# Patient Record
Sex: MALE | Race: WHITE | NOT HISPANIC OR LATINO
[De-identification: names, ages, dates, MRNs, and addresses within clinical notes are randomized per-mention and may not be internally consistent; named-entity substitution may affect disease eponyms.]

---

## 2017-01-04 ENCOUNTER — APPOINTMENT (OUTPATIENT)
Dept: CARDIOLOGY | Facility: CLINIC | Age: 52
End: 2017-01-04

## 2017-01-04 VITALS
HEART RATE: 77 BPM | WEIGHT: 254 LBS | SYSTOLIC BLOOD PRESSURE: 138 MMHG | BODY MASS INDEX: 34.4 KG/M2 | HEIGHT: 72 IN | DIASTOLIC BLOOD PRESSURE: 90 MMHG

## 2017-02-01 ENCOUNTER — APPOINTMENT (OUTPATIENT)
Dept: CARDIOLOGY | Facility: CLINIC | Age: 52
End: 2017-02-01

## 2017-02-18 ENCOUNTER — INPATIENT (INPATIENT)
Facility: HOSPITAL | Age: 52
LOS: 0 days | Discharge: HOME | End: 2017-02-19

## 2017-02-27 ENCOUNTER — APPOINTMENT (OUTPATIENT)
Dept: CARDIOLOGY | Facility: CLINIC | Age: 52
End: 2017-02-27

## 2017-04-24 ENCOUNTER — APPOINTMENT (OUTPATIENT)
Dept: CARDIOLOGY | Facility: CLINIC | Age: 52
End: 2017-04-24

## 2017-04-24 ENCOUNTER — APPOINTMENT (OUTPATIENT)
Dept: SURGERY | Facility: CLINIC | Age: 52
End: 2017-04-24

## 2017-04-24 VITALS
WEIGHT: 242 LBS | BODY MASS INDEX: 32.78 KG/M2 | HEART RATE: 70 BPM | SYSTOLIC BLOOD PRESSURE: 140 MMHG | DIASTOLIC BLOOD PRESSURE: 80 MMHG | HEIGHT: 72 IN

## 2017-06-27 DIAGNOSIS — I10 ESSENTIAL (PRIMARY) HYPERTENSION: ICD-10-CM

## 2017-06-27 DIAGNOSIS — I48.91 UNSPECIFIED ATRIAL FIBRILLATION: ICD-10-CM

## 2017-06-27 DIAGNOSIS — G47.33 OBSTRUCTIVE SLEEP APNEA (ADULT) (PEDIATRIC): ICD-10-CM

## 2017-10-23 ENCOUNTER — APPOINTMENT (OUTPATIENT)
Dept: CARDIOLOGY | Facility: CLINIC | Age: 52
End: 2017-10-23

## 2017-10-23 VITALS
BODY MASS INDEX: 32.91 KG/M2 | WEIGHT: 243 LBS | HEART RATE: 66 BPM | DIASTOLIC BLOOD PRESSURE: 86 MMHG | HEIGHT: 72 IN | SYSTOLIC BLOOD PRESSURE: 140 MMHG

## 2018-02-06 ENCOUNTER — EMERGENCY (EMERGENCY)
Facility: HOSPITAL | Age: 53
LOS: 0 days | Discharge: HOME | End: 2018-02-06
Attending: EMERGENCY MEDICINE

## 2018-02-06 VITALS
WEIGHT: 244.93 LBS | HEIGHT: 72 IN | TEMPERATURE: 98 F | RESPIRATION RATE: 20 BRPM | SYSTOLIC BLOOD PRESSURE: 170 MMHG | DIASTOLIC BLOOD PRESSURE: 100 MMHG | HEART RATE: 102 BPM

## 2018-02-06 VITALS — WEIGHT: 250 LBS

## 2018-02-06 DIAGNOSIS — I10 ESSENTIAL (PRIMARY) HYPERTENSION: ICD-10-CM

## 2018-02-06 DIAGNOSIS — Z98.890 OTHER SPECIFIED POSTPROCEDURAL STATES: Chronic | ICD-10-CM

## 2018-02-06 DIAGNOSIS — Z90.49 ACQUIRED ABSENCE OF OTHER SPECIFIED PARTS OF DIGESTIVE TRACT: Chronic | ICD-10-CM

## 2018-02-06 NOTE — ED PROVIDER NOTE - OBJECTIVE STATEMENT
Pt has a h/o HTN comes to the ED c/o high blood pressure. Pt states that his BP has been fluctuating over the last 2 weeks. Pt states that he feels it go up and begins to get pressure in his head but the pressure is relieved once the BP goes down. Pt has been on the same dose of medication for the last 10 years. No f/c/cp.

## 2018-02-06 NOTE — ED PROVIDER NOTE - NS_ ATTENDINGSCRIBEDETAILS _ED_A_ED_FT
I personally evaluated patient. I agree with the findings and plan with all documentation on chart except as documented  in my note.    Full DC instructions discussed and patient knows when to seek immediate medical attention.  Patient has proper follow up.  All results discussed and patient aware they may require further work up.  Limitations of ED work up discussed.  Home meds discussed.  All questions and concerns from patient or family addressed.

## 2018-02-12 ENCOUNTER — APPOINTMENT (OUTPATIENT)
Dept: CARDIOLOGY | Facility: CLINIC | Age: 53
End: 2018-02-12

## 2018-02-12 VITALS
DIASTOLIC BLOOD PRESSURE: 82 MMHG | HEART RATE: 77 BPM | SYSTOLIC BLOOD PRESSURE: 132 MMHG | WEIGHT: 250 LBS | HEIGHT: 72 IN | BODY MASS INDEX: 33.86 KG/M2

## 2018-03-01 ENCOUNTER — APPOINTMENT (OUTPATIENT)
Dept: CARDIOLOGY | Facility: CLINIC | Age: 53
End: 2018-03-01

## 2018-04-23 ENCOUNTER — INPATIENT (INPATIENT)
Facility: HOSPITAL | Age: 53
LOS: 0 days | Discharge: HOME | End: 2018-04-24
Attending: INTERNAL MEDICINE | Admitting: INTERNAL MEDICINE

## 2018-04-23 ENCOUNTER — APPOINTMENT (OUTPATIENT)
Dept: CARDIOLOGY | Facility: CLINIC | Age: 53
End: 2018-04-23

## 2018-04-23 VITALS
WEIGHT: 251 LBS | SYSTOLIC BLOOD PRESSURE: 100 MMHG | HEART RATE: 162 BPM | HEIGHT: 72 IN | BODY MASS INDEX: 34 KG/M2 | DIASTOLIC BLOOD PRESSURE: 80 MMHG

## 2018-04-23 VITALS
TEMPERATURE: 97 F | DIASTOLIC BLOOD PRESSURE: 91 MMHG | SYSTOLIC BLOOD PRESSURE: 133 MMHG | OXYGEN SATURATION: 100 % | RESPIRATION RATE: 18 BRPM | HEART RATE: 80 BPM

## 2018-04-23 DIAGNOSIS — Z98.890 OTHER SPECIFIED POSTPROCEDURAL STATES: Chronic | ICD-10-CM

## 2018-04-23 DIAGNOSIS — Z90.49 ACQUIRED ABSENCE OF OTHER SPECIFIED PARTS OF DIGESTIVE TRACT: Chronic | ICD-10-CM

## 2018-04-23 LAB
ANION GAP SERPL CALC-SCNC: 13 MMOL/L — SIGNIFICANT CHANGE UP (ref 7–14)
ANION GAP SERPL CALC-SCNC: 14 MMOL/L — SIGNIFICANT CHANGE UP (ref 7–14)
BASOPHILS # BLD AUTO: 0.05 K/UL — SIGNIFICANT CHANGE UP (ref 0–0.2)
BASOPHILS NFR BLD AUTO: 0.6 % — SIGNIFICANT CHANGE UP (ref 0–1)
BUN SERPL-MCNC: 13 MG/DL — SIGNIFICANT CHANGE UP (ref 10–20)
BUN SERPL-MCNC: 16 MG/DL — SIGNIFICANT CHANGE UP (ref 10–20)
CALCIUM SERPL-MCNC: 8.5 MG/DL — SIGNIFICANT CHANGE UP (ref 8.5–10.1)
CALCIUM SERPL-MCNC: 9.1 MG/DL — SIGNIFICANT CHANGE UP (ref 8.5–10.1)
CHLORIDE SERPL-SCNC: 102 MMOL/L — SIGNIFICANT CHANGE UP (ref 98–110)
CHLORIDE SERPL-SCNC: 104 MMOL/L — SIGNIFICANT CHANGE UP (ref 98–110)
CHOLEST SERPL-MCNC: 152 MG/DL — SIGNIFICANT CHANGE UP (ref 100–200)
CK MB CFR SERPL CALC: 1.4 NG/ML — SIGNIFICANT CHANGE UP (ref 0.6–6.3)
CK SERPL-CCNC: 168 U/L — SIGNIFICANT CHANGE UP (ref 0–225)
CO2 SERPL-SCNC: 23 MMOL/L — SIGNIFICANT CHANGE UP (ref 17–32)
CO2 SERPL-SCNC: 26 MMOL/L — SIGNIFICANT CHANGE UP (ref 17–32)
CREAT SERPL-MCNC: 0.8 MG/DL — SIGNIFICANT CHANGE UP (ref 0.7–1.5)
CREAT SERPL-MCNC: 1.1 MG/DL — SIGNIFICANT CHANGE UP (ref 0.7–1.5)
EOSINOPHIL # BLD AUTO: 0.08 K/UL — SIGNIFICANT CHANGE UP (ref 0–0.7)
EOSINOPHIL NFR BLD AUTO: 1 % — SIGNIFICANT CHANGE UP (ref 0–8)
ESTIMATED AVERAGE GLUCOSE: 111 MG/DL — SIGNIFICANT CHANGE UP (ref 68–114)
GLUCOSE SERPL-MCNC: 100 MG/DL — HIGH (ref 70–99)
GLUCOSE SERPL-MCNC: 101 MG/DL — HIGH (ref 70–99)
HBA1C BLD-MCNC: 5.5 % — SIGNIFICANT CHANGE UP (ref 4–5.6)
HCT VFR BLD CALC: 46.7 % — SIGNIFICANT CHANGE UP (ref 42–52)
HDLC SERPL-MCNC: 44 MG/DL — SIGNIFICANT CHANGE UP (ref 40–125)
HGB BLD-MCNC: 15.7 G/DL — SIGNIFICANT CHANGE UP (ref 14–18)
IMM GRANULOCYTES NFR BLD AUTO: 0.4 % — HIGH (ref 0.1–0.3)
LIPID PNL WITH DIRECT LDL SERPL: 100 MG/DL — SIGNIFICANT CHANGE UP (ref 4–129)
LYMPHOCYTES # BLD AUTO: 2.21 K/UL — SIGNIFICANT CHANGE UP (ref 1.2–3.4)
LYMPHOCYTES # BLD AUTO: 28.5 % — SIGNIFICANT CHANGE UP (ref 20.5–51.1)
MAGNESIUM SERPL-MCNC: 1.9 MG/DL — SIGNIFICANT CHANGE UP (ref 1.8–2.4)
MCHC RBC-ENTMCNC: 31 PG — SIGNIFICANT CHANGE UP (ref 27–31)
MCHC RBC-ENTMCNC: 33.6 G/DL — SIGNIFICANT CHANGE UP (ref 32–37)
MCV RBC AUTO: 92.3 FL — SIGNIFICANT CHANGE UP (ref 80–94)
MONOCYTES # BLD AUTO: 1.01 K/UL — HIGH (ref 0.1–0.6)
MONOCYTES NFR BLD AUTO: 13 % — HIGH (ref 1.7–9.3)
NEUTROPHILS # BLD AUTO: 4.38 K/UL — SIGNIFICANT CHANGE UP (ref 1.4–6.5)
NEUTROPHILS NFR BLD AUTO: 56.5 % — SIGNIFICANT CHANGE UP (ref 42.2–75.2)
NRBC # BLD: 0 /100 WBCS — SIGNIFICANT CHANGE UP (ref 0–0)
PLATELET # BLD AUTO: 290 K/UL — SIGNIFICANT CHANGE UP (ref 130–400)
POTASSIUM SERPL-MCNC: 3.9 MMOL/L — SIGNIFICANT CHANGE UP (ref 3.5–5)
POTASSIUM SERPL-MCNC: 4.8 MMOL/L — SIGNIFICANT CHANGE UP (ref 3.5–5)
POTASSIUM SERPL-SCNC: 3.9 MMOL/L — SIGNIFICANT CHANGE UP (ref 3.5–5)
POTASSIUM SERPL-SCNC: 4.8 MMOL/L — SIGNIFICANT CHANGE UP (ref 3.5–5)
RBC # BLD: 5.06 M/UL — SIGNIFICANT CHANGE UP (ref 4.7–6.1)
RBC # FLD: 13.1 % — SIGNIFICANT CHANGE UP (ref 11.5–14.5)
SODIUM SERPL-SCNC: 140 MMOL/L — SIGNIFICANT CHANGE UP (ref 135–146)
SODIUM SERPL-SCNC: 142 MMOL/L — SIGNIFICANT CHANGE UP (ref 135–146)
TOTAL CHOLESTEROL/HDL RATIO MEASUREMENT: 3.5 RATIO — LOW (ref 4–5.5)
TRIGL SERPL-MCNC: 95 MG/DL — SIGNIFICANT CHANGE UP (ref 10–149)
TROPONIN T SERPL-MCNC: <0.01 NG/ML — SIGNIFICANT CHANGE UP
TROPONIN T SERPL-MCNC: <0.01 NG/ML — SIGNIFICANT CHANGE UP
WBC # BLD: 7.76 K/UL — SIGNIFICANT CHANGE UP (ref 4.8–10.8)
WBC # FLD AUTO: 7.76 K/UL — SIGNIFICANT CHANGE UP (ref 4.8–10.8)

## 2018-04-23 RX ORDER — SODIUM CHLORIDE 9 MG/ML
1000 INJECTION INTRAMUSCULAR; INTRAVENOUS; SUBCUTANEOUS
Qty: 0 | Refills: 0 | Status: DISCONTINUED | OUTPATIENT
Start: 2018-04-23 | End: 2018-04-24

## 2018-04-23 RX ORDER — VALSARTAN 80 MG/1
160 TABLET ORAL DAILY
Qty: 0 | Refills: 0 | Status: DISCONTINUED | OUTPATIENT
Start: 2018-04-23 | End: 2018-04-23

## 2018-04-23 RX ORDER — METOPROLOL TARTRATE 50 MG
5 TABLET ORAL ONCE
Qty: 0 | Refills: 0 | Status: COMPLETED | OUTPATIENT
Start: 2018-04-23 | End: 2018-04-23

## 2018-04-23 RX ORDER — AMLODIPINE BESYLATE 2.5 MG/1
5 TABLET ORAL DAILY
Qty: 0 | Refills: 0 | Status: DISCONTINUED | OUTPATIENT
Start: 2018-04-23 | End: 2018-04-23

## 2018-04-23 RX ORDER — AMIODARONE HYDROCHLORIDE 400 MG/1
150 TABLET ORAL ONCE
Qty: 0 | Refills: 0 | Status: COMPLETED | OUTPATIENT
Start: 2018-04-23 | End: 2018-04-23

## 2018-04-23 RX ORDER — ENOXAPARIN SODIUM 100 MG/ML
110 INJECTION SUBCUTANEOUS
Qty: 0 | Refills: 0 | Status: DISCONTINUED | OUTPATIENT
Start: 2018-04-23 | End: 2018-04-24

## 2018-04-23 RX ORDER — DILTIAZEM HCL 120 MG
5 CAPSULE, EXT RELEASE 24 HR ORAL
Qty: 125 | Refills: 0 | Status: DISCONTINUED | OUTPATIENT
Start: 2018-04-23 | End: 2018-04-23

## 2018-04-23 RX ORDER — SODIUM CHLORIDE 9 MG/ML
3 INJECTION INTRAMUSCULAR; INTRAVENOUS; SUBCUTANEOUS ONCE
Qty: 0 | Refills: 0 | Status: COMPLETED | OUTPATIENT
Start: 2018-04-23 | End: 2018-04-23

## 2018-04-23 RX ORDER — AMIODARONE HYDROCHLORIDE 400 MG/1
1 TABLET ORAL
Qty: 900 | Refills: 0 | Status: DISCONTINUED | OUTPATIENT
Start: 2018-04-23 | End: 2018-04-24

## 2018-04-23 RX ORDER — DILTIAZEM HCL 120 MG
5 CAPSULE, EXT RELEASE 24 HR ORAL
Qty: 125 | Refills: 0 | Status: DISCONTINUED | OUTPATIENT
Start: 2018-04-23 | End: 2018-04-24

## 2018-04-23 RX ORDER — ASPIRIN/CALCIUM CARB/MAGNESIUM 324 MG
81 TABLET ORAL DAILY
Qty: 0 | Refills: 0 | Status: DISCONTINUED | OUTPATIENT
Start: 2018-04-23 | End: 2018-04-23

## 2018-04-23 RX ORDER — AMLODIPINE BESYLATE 2.5 MG/1
0 TABLET ORAL
Qty: 0 | Refills: 0 | COMMUNITY

## 2018-04-23 RX ADMIN — ENOXAPARIN SODIUM 110 MILLIGRAM(S): 100 INJECTION SUBCUTANEOUS at 14:54

## 2018-04-23 RX ADMIN — Medication 5 MG/HR: at 22:09

## 2018-04-23 RX ADMIN — SODIUM CHLORIDE 100 MILLILITER(S): 9 INJECTION INTRAMUSCULAR; INTRAVENOUS; SUBCUTANEOUS at 19:30

## 2018-04-23 RX ADMIN — AMIODARONE HYDROCHLORIDE 33.33 MG/MIN: 400 TABLET ORAL at 22:13

## 2018-04-23 RX ADMIN — AMIODARONE HYDROCHLORIDE 618 MILLIGRAM(S): 400 TABLET ORAL at 21:54

## 2018-04-23 RX ADMIN — Medication 5 MILLIGRAM(S): at 14:51

## 2018-04-23 RX ADMIN — Medication 5 MG/HR: at 12:26

## 2018-04-23 RX ADMIN — SODIUM CHLORIDE 3 MILLILITER(S): 9 INJECTION INTRAMUSCULAR; INTRAVENOUS; SUBCUTANEOUS at 13:35

## 2018-04-23 NOTE — ED PROVIDER NOTE - CRITICAL CARE PROVIDED
interpretation of diagnostic studies/direct patient care (not related to procedure)/documentation/consult w/ pt's family directly relating to pts condition/consultation with other physicians

## 2018-04-23 NOTE — CONSULT NOTE ADULT - ASSESSMENT
pafib with RVR , asymptomatic hemodynamically stable  Hx of HTN, ESTEFANIA       Plan:  Tele  echo  Check TSH  Continue IV cardizem may switch to Po in 24 hrs   Continue anticoagulation with lovenox for now  Start amiodarone IV drip if not converted to sinus with consider ELAINA then electrical cardioversion   CAHDSvasc = 1  Continue ARB pafib with RVR , asymptomatic hemodynamically stable  Hx of HTN, ESTEFANIA       Plan:  Tele  echo  Check TSH  Continue IV cardizem may switch to Po in 24 hrs   Continue anticoagulation with lovenox for now  Start amiodarone IV drip if not converted to sinus with consider ELAINA then electrical cardioversion   CAHDSvasc = 1  Continue ARB    Attending: Patient seen and examined. Converted to NSR. Now in sinus rhythm with HR of 70's. No chest pain or palpitations.  Plan: D/c amlodipine. Start Cardizem 180 mg po QD. Will give 4 weeks of anticoagulation (Xarelto 20 mg q24), but then may d/c, as his CHADS-VASC score is low. D/c home today. F/u with me in 2 weeks. Will monitor clinically. If recurrent A. fib will refer to Dr. Blake for ablation, but would try medical therapy as first line. Management of sleep apnea discussed. F/u with pulmonary. F/u with the PMD.  D/c tele, d/c home.  D/w the cardiac fellow and medicine resident.

## 2018-04-23 NOTE — H&P ADULT - ATTENDING COMMENTS
pt seen and examined independently, presented with lightheadedness, and rapid afib,  continue Iv  cardizem,  and lmwh  tele  hold asa  possible cardioversion  cardio appreciated  I  have read and agree with above exam and poa

## 2018-04-23 NOTE — H&P ADULT - NSHPLABSRESULTS_GEN_ALL_CORE
15.7   7.76  )-----------( 290      ( 23 Apr 2018 12:12 )             46.7       04-23    142  |  102  |  16  ----------------------------<  101<H>  4.8   |  26  |  1.1    Ca    9.1      23 Apr 2018 12:12         CARDIAC MARKERS ( 23 Apr 2018 12:12 )  x     / <0.01 ng/mL / x     / x     / x

## 2018-04-23 NOTE — ED PROVIDER NOTE - PHYSICAL EXAMINATION
VSS, awake, alert, non-toxic appearing, lying comfortably in stretcher, in NAD, ears clear, oropharynx clear, mmm, no JVD or bruit, no skin rash or lesions, chest CTAB, non-labored breathing, no w/r/r, +S1/S2, irreg irreg rhythm, no m/r/g, abdomen soft, NT, ND, +BS, no peripheral edema or deformities, alert, clear speech and steady gait.

## 2018-04-23 NOTE — ED PROVIDER NOTE - PROGRESS NOTE DETAILS
HR 80s HR 150s -150 s/p cardizem 10 mg, will give second dose. [id number] score 0 -140, will start IV drip

## 2018-04-23 NOTE — H&P ADULT - NSHPSOCIALHISTORY_GEN_ALL_CORE
Social History:    Marital Status:  ( X  )    (   ) Single    (   )    (  )   Occupation:   Lives with: (  ) alone  (  ) children   ( X  ) spouse   (  ) parents  (  ) other    Substance Use (street drugs): ( X ) never used  (  ) other:  Tobacco Usage:  (  X ) never smoked   (   ) former smoker   (   ) current smoker  (     ) pack year  (        ) last cigarette date  Alcohol Usage: 2-3 drinks/week, last drink 1 week ago

## 2018-04-23 NOTE — H&P ADULT - ASSESSMENT
52 yo M w/ h/o HTN, paroxysmal A Fib in 02/2017--> admitted at [facility], converted to NSR w/ IV medication per patient, ESTEFANIA-CPAP not tolerable, using oral appliance to pull out the jaw, obesity sent in from Dr Brunson's office for finding of A Fib w/ RVR for IV cardizem, IVF, anticoagulation and possible cardioversion.    Pt went to see Dr Brunson for his routine visit, for f/u on blood pressure. His norvasc dose was recently increased to 10 from 5 about a month ago. SInce then he has been feeling lightheaded on getting up from bed and he noticed that his BP is around 110/65 as opposed to 130-140/80-90 before, so he thought Norvasc is causing this lightheadedness and he   changed his norvasc dose to 5 mg and 10 mg alternating everyday. Today he went for routine follow up and on ekg was found to have A Fib w/ RVR.     In ED, VS on presentation, 97.4F, 80, 133/91, 100% ra  received, 20 IV cardizem and started on cardizem drip. ALso received 1 L IV bolus of LR    A & P:    # A fib w/ RVR 2/2 unknown underlying etiology, possibly ESTEFANIA  - admit to telemetry for monitoring  - CE 2 more sets, TSH, check lipid profile and A1c for cardiovascular risk assessment, 2d echo to r/o structural cause  - hold anti-HTN meds for now as per Dr Brunson's note, pt's BP in office was 100s  - c/w cardizem drip for rate control, HR still in 120s-130s  - CHADVASC- 1--> no need of long term anticoagulation but Dr Brunson's note recommends Anticoagulation for possible plan for cardioversion, so will start SQ lovenox q12h  - F/u w/ Dr Brunson regarding plan for Cardioversion  - pt didn't tolerate CPAP machine, can continue w/ oral appliance, counselled on weight loss for better control of ESTEFANIA    # HTN--> hold anti-HTN meds for now as pt is on cardizem drip and may drop BP 2/2 a fib  --> restart meds if BP runs high    # HCM--> hold aspirin for now as pt is going to get SQ lovenox as pt has no h/o CAD    # DVT Px--> lovenox    # Diet--> keep NPO for possible cardioversion     Activity--> OOB w/ assistance 54 yo M w/ h/o HTN, paroxysmal A Fib in 02/2017--> admitted at [facility], converted to NSR w/ IV medication per patient, ESTEFANIA-CPAP not tolerable, using oral appliance to pull out the jaw, obesity sent in from Dr Brunson's office for finding of A Fib w/ RVR for IV cardizem, IVF, anticoagulation and possible cardioversion.    Pt went to see Dr Brunson for his routine visit, for f/u on blood pressure. His norvasc dose was recently increased to 10 from 5 about a month ago. SInce then he has been feeling lightheaded on getting up from bed and he noticed that his BP is around 110/65 as opposed to 130-140/80-90 before, so he thought Norvasc is causing this lightheadedness and he   changed his norvasc dose to 5 mg and 10 mg alternating everyday. Today he went for routine follow up and on ekg was found to have A Fib w/ RVR.     In ED, VS on presentation, 97.4F, 80, 133/91, 100% ra  received, 20 IV cardizem and started on cardizem drip. ALso received 1 L IV bolus of LR    A & P:    # A fib w/ RVR 2/2 unknown underlying etiology, possibly ESTEFANIA  - admit to telemetry for monitoring  - CE 2 more sets, TSH, check lipid profile and A1c for cardiovascular risk assessment, 2d echo to r/o structural cause  - hold anti-HTN meds for now as per Dr Brunson's note, pt's BP in office was 100s  - c/w cardizem drip for rate control, HR still in 120s-130s--> give 1 dose of IV lopressor 5 mg  - CHADVASC- 1--> no need of long term anticoagulation but Dr Brunson's note recommends Anticoagulation for possible plan for cardioversion, so will start SQ lovenox 110 q12h  - F/u w/ Dr Brunson regarding plan for Cardioversion  - pt didn't tolerate CPAP machine, can continue w/ oral appliance, counselled on weight loss for better control of ESTEFANIA    # HTN--> hold anti-HTN meds for now as pt is on cardizem drip and may drop BP 2/2 a fib  --> restart meds if BP runs high    # HCM--> hold aspirin for now as pt is going to get SQ lovenox as pt has no h/o CAD    # DVT Px--> lovenox    # Diet--> keep NPO for possible cardioversion     Activity--> OOB w/ assistance

## 2018-04-23 NOTE — H&P ADULT - HISTORY OF PRESENT ILLNESS
52 yo M w/ h/o HTN, paroxysmal A Fib in 02/2017--> admitted at [facility], converted to NSR w/ IV medication per patient, ESTEFANIA-CPAP not tolerable, using oral appliance to pull out the jaw, obesity sent in from Dr Brunson's office for finding of A Fib w/ RVR for IV cardizem, IVF, anticoagulation and possible cardioversion.    Pt went to see Dr Brunson for his routine visit, for f/u on blood pressure. His norvasc dose was recently increased to 10 from 5 about a month ago. SInce then he has been feeling lightheaded on getting up from bed and he noticed that his BP is around 110/65 as opposed to 130-140/80-90 before, so he thought Norvasc is causing this lightheadedness and he   changed his norvasc dose to 5 mg and 10 mg alternating everyday. Today he went for routine follow up and on ekg was found to have A Fib w/ RVR.

## 2018-04-23 NOTE — ED PROVIDER NOTE - OBJECTIVE STATEMENT
54 y/o male h/o hypertension, atrial fibrillation (one episode approximately 1 year ago), appendectomy and cholecystectomy presents with rapid atrial fibrillation today.  Patient went for routine evaluation with his cardiologist Dr. Brunson.  At this time he was found to be tachycardic, patient does report some dizziness and lightheadedness on standing up otherwise denies modifying factors or associated complaints at present.

## 2018-04-23 NOTE — CONSULT NOTE ADULT - SUBJECTIVE AND OBJECTIVE BOX
HISTORY OF PRESENT ILLNESS:     This is a 53 years old M patient with PMHx of  HTN, paroxysmal A Fib , ESTEFANIA-CPAP not tolerable,  obese sent in from Dr Brunson's office for A Fib w/ RVR  Pt went to see Dr rBunson for his routine visit, for f/u on blood pressure.         PAST MEDICAL & SURGICAL HISTORY:  Atrial fibrillation: paroxysmal  Sleep apnea  Hypertension, unspecified type  History of appendectomy  Absence of gallbladder    FAMILY HISTORY:  No pertinent family history in first degree relatives    Allergies    Percocet 10/325 (Unknown)    Intolerances    	  Home Medications:  amLODIPine 2.5 mg oral tablet, disintegrating: 10  mg q48h anternates w/ 5 mg q48h  (23 Apr 2018 14:14)  Aspir 81 oral delayed release tablet: 1 tab(s) orally once a day (23 Apr 2018 14:14)  valsartan 160 mg oral capsule:  (23 Apr 2018 14:14)    MEDICATIONS  (STANDING):  diltiazem Infusion 5 mG/Hr (5 mL/Hr) IV Continuous <Continuous>  enoxaparin Injectable 110 milliGRAM(s) SubCutaneous two times a day  sodium chloride 0.9%. 1000 milliLiter(s) (100 mL/Hr) IV Continuous <Continuous>    MEDICATIONS  (PRN):        SOCIAL HISTORY:    [ ] Non-smoker  [ ] Smoker  [ ] Alcohol      REVIEW OF SYSTEMS:    PHYSICAL EXAM:  T(C): 36.9 (04-23-18 @ 15:26), Max: 36.9 (04-23-18 @ 15:26)  HR: 120 (04-23-18 @ 18:41) (73 - 159)  BP: 128/91 (04-23-18 @ 18:41) (99/74 - 133/91)  RR: 17 (04-23-18 @ 18:41) (17 - 18)  SpO2: 96% (04-23-18 @ 18:41) (96% - 100%)  Wt(kg): --  I&O's Summary    Daily     Daily     General Appearance: Normal	  Cardiovascular: Normal S1 S2, No JVD, No murmurs, No edema  Respiratory: Lungs clear to auscultation	  Psychiatry: A & O x 3, Mood & affect appropriate  Gastrointestinal:  Soft, Non-tender  Skin: No rashes, No ecchymoses, No cyanosis	  Neurologic: Non-focal  Extremities: Normal range of motion, No clubbing, cyanosis or edema  Vascular: Peripheral pulses palpable 2+ bilaterally        LABS:	 	                        15.7   7.76  )-----------( 290      ( 23 Apr 2018 12:12 )             46.7     04-23    140  |  104  |  13  ----------------------------<  100<H>  3.9   |  23  |  0.8  04-23    142  |  102  |  16  ----------------------------<  101<H>  4.8   |  26  |  1.1    Ca    8.5      23 Apr 2018 16:11  Ca    9.1      23 Apr 2018 12:12  Mg     1.9     04-23        proBNP:   Lipid Profile:   HgA1c: Hemoglobin A1C, Whole Blood: 5.5 % (04-23 @ 16:11)    TSH:       CARDIAC MARKERS:  Troponin T, Serum: <0.01 ng/mL (04-23 @ 16:11)  Troponin T, Serum: <0.01 ng/mL (04-23 @ 12:12)        TELEMETRY EVENTS: 	    ECG: < from: 12 Lead ECG (04.23.18 @ 12:37) >  Atrial fibrillation with rapid ventricular response  Left axisdeviation  Abnormal ECG    < end of copied text >   	  RADIOLOGY:  < from: Xray Chest 1 View AP/PA (04.23.18 @ 14:21) >  Impression:      No radiographic evidence of acute cardiopulmonary disease.    < end of copied text >  	    PREVIOUS DIAGNOSTIC TESTING:    [ ] Echocardiogram: 2/2017 normal EF HISTORY OF PRESENT ILLNESS:     This is a 53 years old M patient with PMHx of  HTN, paroxysmal A Fib , ESTEFANIA-CPAP not tolerable was visiting cardiology office for routine follow up was found in afib with RVR , sent to ED for further management. Patient started on IV cardizem with better rate control. He denies chest pain , shortness of breath or palpitations. He has normal activity level. Diagnose with afib in 2/2017 initial and converted to sinus by medicine. NOt on AC low CHADSvasc socre.       PAST MEDICAL & SURGICAL HISTORY:  Atrial fibrillation: paroxysmal  Sleep apnea  Hypertension, unspecified type  History of appendectomy  Absence of gallbladder    FAMILY HISTORY:  No pertinent family history in first degree relatives    Allergies    Percocet 10/325 (Unknown)      	  Home Medications:  amLODIPine 2.5 mg oral tablet, disintegrating: 10  mg q48h anternates w/ 5 mg q48h  (23 Apr 2018 14:14)  Aspir 81 oral delayed release tablet: 1 tab(s) orally once a day (23 Apr 2018 14:14)  valsartan 160 mg oral capsule:  (23 Apr 2018 14:14)    MEDICATIONS  (STANDING):  diltiazem Infusion 5 mG/Hr (5 mL/Hr) IV Continuous <Continuous>  enoxaparin Injectable 110 milliGRAM(s) SubCutaneous two times a day  sodium chloride 0.9%. 1000 milliLiter(s) (100 mL/Hr) IV Continuous <Continuous>        PHYSICAL EXAM:  T(C): 36.9 (04-23-18 @ 15:26), Max: 36.9 (04-23-18 @ 15:26)  HR: 120 (04-23-18 @ 18:41) (73 - 159)  BP: 128/91 (04-23-18 @ 18:41) (99/74 - 133/91)  RR: 17 (04-23-18 @ 18:41) (17 - 18)  SpO2: 96% (04-23-18 @ 18:41) (96% - 100%)        General Appearance: Normal	  Cardiovascular: Normal S1 S2, No JVD, No murmurs, No edema irregularly irregular  Respiratory: Lungs clear to auscultation	  Psychiatry: A & O x 3,  Gastrointestinal:  Soft, Non-tender  Extremities: no TIANNA        LABS:	 	                        15.7   7.76  )-----------( 290      ( 23 Apr 2018 12:12 )             46.7     04-23    140  |  104  |  13  ----------------------------<  100<H>  3.9   |  23  |  0.8  04-23    142  |  102  |  16  ----------------------------<  101<H>  4.8   |  26  |  1.1    Ca    8.5      23 Apr 2018 16:11  Ca    9.1      23 Apr 2018 12:12  Mg     1.9     04-23        proBNP:   Lipid Profile:   HgA1c: Hemoglobin A1C, Whole Blood: 5.5 % (04-23 @ 16:11)        CARDIAC MARKERS:  Troponin T, Serum: <0.01 ng/mL (04-23 @ 16:11)  Troponin T, Serum: <0.01 ng/mL (04-23 @ 12:12)        TELEMETRY EVENTS: afib 	    ECG: < from: 12 Lead ECG (04.23.18 @ 12:37) >  Atrial fibrillation with rapid ventricular response  Left axisdeviation  Abnormal ECG    < end of copied text >   	  RADIOLOGY:  < from: Xray Chest 1 View AP/PA (04.23.18 @ 14:21) >  Impression:      No radiographic evidence of acute cardiopulmonary disease.    < end of copied text >  	    PREVIOUS DIAGNOSTIC TESTING:    [ ] Echocardiogram: 2/2017 normal EF

## 2018-04-23 NOTE — H&P ADULT - NSHPPHYSICALEXAM_GEN_ALL_CORE
GENERAL: NAD, well-developed  HEAD:  Atraumatic, Normocephalic  EYES: conjunctiva and sclera clear  NECK: Supple  CHEST/LUNG: Clear to auscultation bilaterally; No wheeze  HEART: irregular, fast rate; No murmurs, rubs, or gallops  ABDOMEN: Soft, Nontender, Nondistended; Bowel sounds present  EXTREMITIES:  2+ Peripheral Pulses, No clubbing, cyanosis, or edema  PSYCH: AAOx3  NEUROLOGY: non-focal  SKIN: No rashes or lesions

## 2018-04-23 NOTE — H&P ADULT - NSHPREVIEWOFSYSTEMS_GEN_ALL_CORE
Denies chest pain/SOB at rest/exertion, palpitations, recent alcohol or excessive caffeine use, fever/any sickness in recent past, pain or swelling in legs, weight changes, constipation/diarrhea.    Pt had a stress test about a yr ago--> normal per patient

## 2018-04-24 ENCOUNTER — TRANSCRIPTION ENCOUNTER (OUTPATIENT)
Age: 53
End: 2018-04-24

## 2018-04-24 VITALS
SYSTOLIC BLOOD PRESSURE: 125 MMHG | TEMPERATURE: 99 F | DIASTOLIC BLOOD PRESSURE: 74 MMHG | RESPIRATION RATE: 18 BRPM | HEART RATE: 83 BPM

## 2018-04-24 LAB
ANION GAP SERPL CALC-SCNC: 11 MMOL/L — SIGNIFICANT CHANGE UP (ref 7–14)
BUN SERPL-MCNC: 13 MG/DL — SIGNIFICANT CHANGE UP (ref 10–20)
CALCIUM SERPL-MCNC: 8.9 MG/DL — SIGNIFICANT CHANGE UP (ref 8.5–10.1)
CHLORIDE SERPL-SCNC: 102 MMOL/L — SIGNIFICANT CHANGE UP (ref 98–110)
CO2 SERPL-SCNC: 27 MMOL/L — SIGNIFICANT CHANGE UP (ref 17–32)
CREAT SERPL-MCNC: 1.1 MG/DL — SIGNIFICANT CHANGE UP (ref 0.7–1.5)
GLUCOSE SERPL-MCNC: 111 MG/DL — HIGH (ref 70–99)
MAGNESIUM SERPL-MCNC: 2 MG/DL — SIGNIFICANT CHANGE UP (ref 1.8–2.4)
POTASSIUM SERPL-MCNC: 4.4 MMOL/L — SIGNIFICANT CHANGE UP (ref 3.5–5)
POTASSIUM SERPL-SCNC: 4.4 MMOL/L — SIGNIFICANT CHANGE UP (ref 3.5–5)
SODIUM SERPL-SCNC: 140 MMOL/L — SIGNIFICANT CHANGE UP (ref 135–146)

## 2018-04-24 RX ORDER — DILTIAZEM HCL 120 MG
180 CAPSULE, EXT RELEASE 24 HR ORAL DAILY
Qty: 0 | Refills: 0 | Status: DISCONTINUED | OUTPATIENT
Start: 2018-04-24 | End: 2018-04-24

## 2018-04-24 RX ORDER — ASPIRIN/CALCIUM CARB/MAGNESIUM 324 MG
1 TABLET ORAL
Qty: 0 | Refills: 0 | COMMUNITY

## 2018-04-24 RX ORDER — DILTIAZEM HCL 120 MG
5 CAPSULE, EXT RELEASE 24 HR ORAL
Qty: 125 | Refills: 0 | Status: DISCONTINUED | OUTPATIENT
Start: 2018-04-24 | End: 2018-04-24

## 2018-04-24 RX ORDER — AMLODIPINE BESYLATE 2.5 MG/1
10 TABLET ORAL
Qty: 0 | Refills: 0 | COMMUNITY

## 2018-04-24 RX ORDER — AMIODARONE HYDROCHLORIDE 400 MG/1
0.5 TABLET ORAL
Qty: 900 | Refills: 0 | Status: DISCONTINUED | OUTPATIENT
Start: 2018-04-24 | End: 2018-04-24

## 2018-04-24 RX ORDER — DILTIAZEM HCL 120 MG
1 CAPSULE, EXT RELEASE 24 HR ORAL
Qty: 30 | Refills: 0 | OUTPATIENT
Start: 2018-04-24

## 2018-04-24 RX ORDER — RIVAROXABAN 15 MG-20MG
1 KIT ORAL
Qty: 30 | Refills: 0 | OUTPATIENT
Start: 2018-04-24

## 2018-04-24 RX ORDER — RIVAROXABAN 15 MG-20MG
20 KIT ORAL EVERY 24 HOURS
Qty: 0 | Refills: 0 | Status: DISCONTINUED | OUTPATIENT
Start: 2018-04-24 | End: 2018-04-24

## 2018-04-24 RX ADMIN — AMIODARONE HYDROCHLORIDE 16.67 MG/MIN: 400 TABLET ORAL at 04:32

## 2018-04-24 RX ADMIN — Medication 5 MG/HR: at 04:33

## 2018-04-24 RX ADMIN — Medication 180 MILLIGRAM(S): at 10:29

## 2018-04-24 RX ADMIN — ENOXAPARIN SODIUM 110 MILLIGRAM(S): 100 INJECTION SUBCUTANEOUS at 06:31

## 2018-04-24 NOTE — DISCHARGE NOTE ADULT - CARE PROVIDERS DIRECT ADDRESSES
,DirectAddress_Unknown,[email or web address] ,DirectAddress_Unknown,[email or web address],DirectAddress_Unknown

## 2018-04-24 NOTE — PROGRESS NOTE ADULT - SUBJECTIVE AND OBJECTIVE BOX
Patient was seen and examined. Spoke with RN. Chart reviewed.    No events overnight.  Vital Signs Last 24 Hrs  T(F): 98.5 (24 Apr 2018 10:00), Max: 98.5 (23 Apr 2018 15:26)  HR: 83 (24 Apr 2018 10:00) (70 - 159)  BP: 118/72 (24 Apr 2018 10:00) (99/74 - 143/64)  SpO2: 97% (24 Apr 2018 03:30) (96% - 100%)  MEDICATIONS  (STANDING):  diltiazem    milliGRAM(s) Oral daily  rivaroxaban 20 milliGRAM(s) Oral every 24 hours    MEDICATIONS  (PRN):    Labs:                        15.7   7.76  )-----------( 290      ( 23 Apr 2018 12:12 )             46.7     24 Apr 2018 08:57    140    |  102    |  13     ----------------------------<  111    4.4     |  27     |  1.1    23 Apr 2018 16:11    140    |  104    |  13     ----------------------------<  100    3.9     |  23     |  0.8      Ca    8.9        24 Apr 2018 08:57  Ca    8.5        23 Apr 2018 16:11  Mg     2.0       24 Apr 2018 08:57  Mg     1.9       23 Apr 2018 16:11              Radiology:    General: comfortable, NAD  Neurology: A&Ox3, nonfocal  Head:  Normocephalic, atraumatic  ENT:  Mucosa moist, no ulcerations  Neck:  Supple, no JVD,   Skin: no breakdowns (as per RN)  Resp: CTA B/L  CV:i irir, S1S2,   GI: Soft, NT, bowel sounds  MS: No edema, + peripheral pulses, FROM all 4 extremity

## 2018-04-24 NOTE — DISCHARGE NOTE ADULT - PATIENT PORTAL LINK FT
You can access the [facility] Patient Portal, offered by [facility], by registering with the following website: [email or web address]

## 2018-04-24 NOTE — DISCHARGE NOTE ADULT - PROVIDER TOKENS
TOKEN:'[id number]',TOKEN:'[id number]' TOKEN:'[id number]',TOKEN:'[id number]',FREE:[LAST:[pulmonary],PHONE:[(   )    -],FAX:[(   )    -],ADDRESS:[PUlmonary]]

## 2018-04-24 NOTE — DISCHARGE NOTE ADULT - CARE PLAN
Principal Discharge DX:	Atrial fibrillation  Goal:	rate control & anticoagulation  Assessment and plan of treatment:	f/u with pmd & cardio

## 2018-04-24 NOTE — DISCHARGE NOTE ADULT - CARE PROVIDER_API CALL
Cristi Barron (), Internal Medicine  [address] [place] [address]  Phone: [phone]  Fax: [phone]    Jeremiah Brunson (MD), Cardiovascular Disease; Internal Medicine; Interventional Cardiology; Nuclear Cardiology  [address] [place] [address]  Phone: [phone]  Fax: [phone] Cristi Barron (), Internal Medicine  [address] [place] [address]  Phone: [phone]  Fax: [phone]    Jeremiah Brunson (MD), Cardiovascular Disease; Internal Medicine; Interventional Cardiology; Nuclear Cardiology  [address] [place] [address]  Phone: [phone]  Fax: [phone]    pulmonary,   PUlmonary  Phone: (   )    -  Fax: (   )    -

## 2018-04-24 NOTE — PROGRESS NOTE ADULT - ASSESSMENT
# A fib w/ RVR 2/2 unknown underlying etiology, possibly ESTEFANIA  - admit to telemetry for monitoring  - CE 2 more sets, TSH, check lipid profile and A1c for cardiovascular risk assessment, 2d echo to r/o structural cause  - hold anti-HTN meds for now as per Dr Brunson's note, pt's BP in office was 100s  -  - CHADVASC- 1--> no need of long term anticoagulation but Dr Brunson's note recommends Anticoagulation for possible plan for cardioversion outpt so will start SQ lovenox 110 q12h  - F/u w/ Dr Brunson regarding plan for Cardioversion  - pt didn't tolerate CPAP machine, can continue w/ oral appliance, counselled on weight loss for better control of ESTEFANIA    # HTN--> hold anti-HTN meds for now as pt is on cardizem drip and may drop BP 2/2 a fib  --> restart meds if BP runs high    # HCM--> hold aspirin for now as pt is going to get SQ lovenox as pt has no h/o CAD    # DVT Px--> lovenox    # Diet--> keep NPO for possible cardioversion     Activity--> OOB w/ assistance     dc home with randell miller

## 2018-04-24 NOTE — DISCHARGE NOTE ADULT - HOSPITAL COURSE
54 yo M w/ h/o HTN, paroxysmal A Fib in 02/2017--> admitted at [facility], converted to NSR w/ IV medication per patient, ESTEFANIA-CPAP not tolerable, using oral appliance to pull out the jaw, obesity sent in from Dr Brunson's office for finding of A Fib w/ RVR for IV cardizem, IVF, anticoagulation and possible cardioversion.  During hospitazation, pt was seen by cardio; was given rate control & anticoagulation  - pt advised to f/u with pmd & cardio

## 2018-04-24 NOTE — DISCHARGE NOTE ADULT - MEDICATION SUMMARY - MEDICATIONS TO STOP TAKING
I will STOP taking the medications listed below when I get home from the hospital:    amLODIPine 2.5 mg oral tablet, disintegrating  -- 10  mg q48h anternates w/ 5 mg q48h I will STOP taking the medications listed below when I get home from the hospital:    Aspir 81 oral delayed release tablet  -- 1 tab(s) by mouth once a day    amLODIPine 2.5 mg oral tablet, disintegrating  -- 10  mg q48h anternates w/ 5 mg q48h

## 2018-04-24 NOTE — DISCHARGE NOTE ADULT - MEDICATION SUMMARY - MEDICATIONS TO TAKE
I will START or STAY ON the medications listed below when I get home from the hospital:    Aspir 81 oral delayed release tablet  -- 1 tab(s) by mouth once a day  -- Indication: For Atrial fibrillation    valsartan 160 mg oral capsule  -- Indication: For htn    dilTIAZem 180 mg/24 hours oral capsule, extended release  -- 1 cap(s) by mouth once a day  -- Indication: For Atrial fibrillation    rivaroxaban 20 mg oral tablet  -- 1 tab(s) by mouth every 24 hours  -- Indication: For Atrial fibrillation I will START or STAY ON the medications listed below when I get home from the hospital:    valsartan 160 mg oral capsule  -- Indication: For htn    dilTIAZem 180 mg/24 hours oral capsule, extended release  -- 1 cap(s) by mouth once a day  -- Indication: For Atrial fibrillation    rivaroxaban 20 mg oral tablet  -- 1 tab(s) by mouth every 24 hours  -- Indication: For Atrial fibrillation

## 2018-04-30 ENCOUNTER — MESSAGE (OUTPATIENT)
Age: 53
End: 2018-04-30

## 2018-05-03 DIAGNOSIS — I48.91 UNSPECIFIED ATRIAL FIBRILLATION: ICD-10-CM

## 2018-05-03 DIAGNOSIS — I48.0 PAROXYSMAL ATRIAL FIBRILLATION: ICD-10-CM

## 2018-05-03 DIAGNOSIS — I10 ESSENTIAL (PRIMARY) HYPERTENSION: ICD-10-CM

## 2018-05-03 DIAGNOSIS — I42.2 OTHER HYPERTROPHIC CARDIOMYOPATHY: ICD-10-CM

## 2018-05-03 DIAGNOSIS — Z79.82 LONG TERM (CURRENT) USE OF ASPIRIN: ICD-10-CM

## 2018-05-03 DIAGNOSIS — G47.33 OBSTRUCTIVE SLEEP APNEA (ADULT) (PEDIATRIC): ICD-10-CM

## 2018-05-07 ENCOUNTER — APPOINTMENT (OUTPATIENT)
Dept: CARDIOLOGY | Facility: CLINIC | Age: 53
End: 2018-05-07

## 2018-05-07 VITALS
WEIGHT: 250 LBS | DIASTOLIC BLOOD PRESSURE: 82 MMHG | BODY MASS INDEX: 33.86 KG/M2 | HEIGHT: 72 IN | SYSTOLIC BLOOD PRESSURE: 120 MMHG | HEART RATE: 64 BPM

## 2018-06-04 ENCOUNTER — APPOINTMENT (OUTPATIENT)
Dept: CARDIOLOGY | Facility: CLINIC | Age: 53
End: 2018-06-04

## 2018-06-04 VITALS
DIASTOLIC BLOOD PRESSURE: 80 MMHG | WEIGHT: 248 LBS | BODY MASS INDEX: 33.59 KG/M2 | SYSTOLIC BLOOD PRESSURE: 130 MMHG | HEART RATE: 76 BPM | HEIGHT: 72 IN

## 2018-07-07 ENCOUNTER — INPATIENT (INPATIENT)
Facility: HOSPITAL | Age: 53
LOS: 1 days | Discharge: HOME | End: 2018-07-09
Attending: INTERNAL MEDICINE | Admitting: INTERNAL MEDICINE

## 2018-07-07 VITALS
DIASTOLIC BLOOD PRESSURE: 97 MMHG | OXYGEN SATURATION: 98 % | HEART RATE: 152 BPM | RESPIRATION RATE: 18 BRPM | WEIGHT: 250 LBS | TEMPERATURE: 97 F | SYSTOLIC BLOOD PRESSURE: 167 MMHG | HEIGHT: 72 IN

## 2018-07-07 DIAGNOSIS — Z98.890 OTHER SPECIFIED POSTPROCEDURAL STATES: Chronic | ICD-10-CM

## 2018-07-07 DIAGNOSIS — Z90.49 ACQUIRED ABSENCE OF OTHER SPECIFIED PARTS OF DIGESTIVE TRACT: Chronic | ICD-10-CM

## 2018-07-07 LAB
ANION GAP SERPL CALC-SCNC: 13 MMOL/L — SIGNIFICANT CHANGE UP (ref 7–14)
APTT BLD: 30.9 SEC — SIGNIFICANT CHANGE UP (ref 27–39.2)
BUN SERPL-MCNC: 14 MG/DL — SIGNIFICANT CHANGE UP (ref 10–20)
CALCIUM SERPL-MCNC: 9.1 MG/DL — SIGNIFICANT CHANGE UP (ref 8.5–10.1)
CHLORIDE SERPL-SCNC: 102 MMOL/L — SIGNIFICANT CHANGE UP (ref 98–110)
CK MB CFR SERPL CALC: 1.7 NG/ML — SIGNIFICANT CHANGE UP (ref 0.6–6.3)
CK SERPL-CCNC: 140 U/L — SIGNIFICANT CHANGE UP (ref 0–225)
CK SERPL-CCNC: 170 U/L — SIGNIFICANT CHANGE UP (ref 0–225)
CO2 SERPL-SCNC: 26 MMOL/L — SIGNIFICANT CHANGE UP (ref 17–32)
CREAT SERPL-MCNC: 1.1 MG/DL — SIGNIFICANT CHANGE UP (ref 0.7–1.5)
GLUCOSE SERPL-MCNC: 104 MG/DL — HIGH (ref 70–99)
HCT VFR BLD CALC: 48.6 % — SIGNIFICANT CHANGE UP (ref 42–52)
HGB BLD-MCNC: 16.3 G/DL — SIGNIFICANT CHANGE UP (ref 14–18)
INR BLD: 1.05 RATIO — SIGNIFICANT CHANGE UP (ref 0.65–1.3)
MCHC RBC-ENTMCNC: 31.1 PG — HIGH (ref 27–31)
MCHC RBC-ENTMCNC: 33.5 G/DL — SIGNIFICANT CHANGE UP (ref 32–37)
MCV RBC AUTO: 92.7 FL — SIGNIFICANT CHANGE UP (ref 80–94)
NRBC # BLD: 0 /100 WBCS — SIGNIFICANT CHANGE UP (ref 0–0)
PLATELET # BLD AUTO: 314 K/UL — SIGNIFICANT CHANGE UP (ref 130–400)
POTASSIUM SERPL-MCNC: 4.5 MMOL/L — SIGNIFICANT CHANGE UP (ref 3.5–5)
POTASSIUM SERPL-SCNC: 4.5 MMOL/L — SIGNIFICANT CHANGE UP (ref 3.5–5)
PROTHROM AB SERPL-ACNC: 11.4 SEC — SIGNIFICANT CHANGE UP (ref 9.95–12.87)
RBC # BLD: 5.24 M/UL — SIGNIFICANT CHANGE UP (ref 4.7–6.1)
RBC # FLD: 12.8 % — SIGNIFICANT CHANGE UP (ref 11.5–14.5)
SODIUM SERPL-SCNC: 141 MMOL/L — SIGNIFICANT CHANGE UP (ref 135–146)
TROPONIN T SERPL-MCNC: <0.01 NG/ML — SIGNIFICANT CHANGE UP
TROPONIN T SERPL-MCNC: <0.01 NG/ML — SIGNIFICANT CHANGE UP
WBC # BLD: 8.24 K/UL — SIGNIFICANT CHANGE UP (ref 4.8–10.8)
WBC # FLD AUTO: 8.24 K/UL — SIGNIFICANT CHANGE UP (ref 4.8–10.8)

## 2018-07-07 RX ORDER — VALSARTAN 80 MG/1
160 TABLET ORAL AT BEDTIME
Qty: 0 | Refills: 0 | Status: DISCONTINUED | OUTPATIENT
Start: 2018-07-07 | End: 2018-07-09

## 2018-07-07 RX ORDER — DILTIAZEM HCL 120 MG
5 CAPSULE, EXT RELEASE 24 HR ORAL
Qty: 125 | Refills: 0 | Status: DISCONTINUED | OUTPATIENT
Start: 2018-07-07 | End: 2018-07-08

## 2018-07-07 RX ORDER — ENOXAPARIN SODIUM 100 MG/ML
100 INJECTION SUBCUTANEOUS ONCE
Qty: 0 | Refills: 0 | Status: COMPLETED | OUTPATIENT
Start: 2018-07-07 | End: 2018-07-07

## 2018-07-07 RX ORDER — ENOXAPARIN SODIUM 100 MG/ML
110 INJECTION SUBCUTANEOUS
Qty: 0 | Refills: 0 | Status: DISCONTINUED | OUTPATIENT
Start: 2018-07-07 | End: 2018-07-09

## 2018-07-07 RX ADMIN — Medication 5 MG/HR: at 21:30

## 2018-07-07 RX ADMIN — Medication 5 MG/HR: at 17:49

## 2018-07-07 RX ADMIN — ENOXAPARIN SODIUM 100 MILLIGRAM(S): 100 INJECTION SUBCUTANEOUS at 20:32

## 2018-07-07 NOTE — ED PROVIDER NOTE - PHYSICAL EXAMINATION
GENERAL:  well appearing, non-toxic male in no acute distress  SKIN: skin warm, pink and dry. MMM. no pallor or diaphoresis  PULM: CTAB. Normal respiratory effort. No respiratory distress. No wheezes, stridor, rales or rhonchi. No retractions  CV: + irregularly irregular tachycardia. M/R/G.   ABD: Soft, non-tender, non-distended  MSK: FROM of all extremities.  No MSK tenderness. No edema, erythema, cyanosis. Distal pulses intact.  NEURO: A+Ox3, no sensory/motor deficits, CN II-XII intact. No speech slurring, pronator drift, facial asymmetry. Normal finger-to-nose  b/l. 5/5 strength throughout. Normal gait. Negative Romberg.

## 2018-07-07 NOTE — CONSULT NOTE ADULT - SUBJECTIVE AND OBJECTIVE BOX
Patient is a 53y old  Male who presents with a chief complaint of SOB/ not feeling well    HPI: 54 yo male with h/o HTN, sleep apnea cant use CPAP on oral appliance, paroxysmal a fib s/p 2 admissions on 2/17 and 4/18 presents to the ED stating his heart rate is fast and is concerned he is in A fib. Patient explains last time he was in ED in April 2018 for a fib patient was started on PO Cardizem and xarelto (4 weeks then changed to aspirin stated also xarelto gave him back pain). Patient states he was feeling right last night and today. Patient checked his HR and his heart rate was fast and irregular. Patient's fit bit showed a heart rate in the 150s. Associated with mild chest pressure. Denies fever, chills, headache, dizziness, syncope, sob, abdominal pain, N/V, UE/LE weakness or paresthesias. in ed rapid a fib was started on cardizem drip and called to evaluate      PAST MEDICAL & SURGICAL HISTORY:  Atrial fibrillation: paroxysmal  Sleep apnea  Hypertension, unspecified type  History of appendectomy  Absence of gallbladder      SOCIAL HX:   Smoking   denies    FAMILY HISTORY:  No pertinent family history in first degree relatives      REVIEW OF SYSTEMS see hpi    Allergies    No Known Allergies    Intolerances    Percocet 5/325 (Other)      diltiazem Infusion 5 mG/Hr IV Continuous <Continuous>  enoxaparin Injectable 100 milliGRAM(s) SubCutaneous Once  : Home Meds:      PHYSICAL EXAM    ICU Vital Signs Last 24 Hrs  T(C): 36.3 (07 Jul 2018 17:13), Max: 36.3 (07 Jul 2018 17:13)  T(F): 97.3 (07 Jul 2018 17:13), Max: 97.3 (07 Jul 2018 17:13)  HR: 107 (07 Jul 2018 18:58) (107 - 159)  BP: 112/64 (07 Jul 2018 18:58) (112/64 - 167/97)  BP(mean): --  ABP: --  ABP(mean): --  RR: 18 (07 Jul 2018 17:13) (18 - 18)  SpO2: 95% (07 Jul 2018 18:58) (95% - 98%)      General:  HEENT:  BOSSMAN              Lymph Nodes: No cervical LN   Lungs: Bilateral BS  Cardiovascular: S1S2 irregular fast  Abdomen: Soft, Positive BS  Extremities: No clubbing  Skin: Warm  Neurological: Non focal         LABS:                          16.3   8.24  )-----------( 314      ( 07 Jul 2018 17:26 )             48.6                                               07-07    141  |  102  |  14  ----------------------------<  104<H>  4.5   |  26  |  1.1    Ca    9.1      07 Jul 2018 17:26        PT/INR - ( 07 Jul 2018 17:26 )   PT: 11.40 sec;   INR: 1.05 ratio         PTT - ( 07 Jul 2018 17:26 )  PTT:30.9 sec                                           CARDIAC MARKERS ( 07 Jul 2018 17:26 )  x     / <0.01 ng/mL / 170 U/L / x     / 1.7 ng/mL                                                                                                                                                                          X-Rays  reviewed    MEDICATIONS  (STANDING):  diltiazem Infusion 5 mG/Hr (5 mL/Hr) IV Continuous <Continuous>  enoxaparin Injectable 100 milliGRAM(s) SubCutaneous Once    MEDICATIONS  (PRN):

## 2018-07-07 NOTE — ED PROVIDER NOTE - MEDICAL DECISION MAKING DETAILS
Rate control with Cardizem.  Pt seen in 110s.  Reports feeling better.  d/w Intensivist.  Pt continued on Cardizem gtt,  Will admit to CCu/ICU.  Dr Pickard aware

## 2018-07-07 NOTE — ED ADULT NURSE REASSESSMENT NOTE - NS ED NURSE REASSESS COMMENT FT1
Report given to [facility]; pt stable for transport; AAOx3 breathing RA w/ normal WOB. IV site intact; Cardizem 125mg 15mg/hr infusing well to right A/C. pt denies any pain/ discomfort.

## 2018-07-07 NOTE — ED PROVIDER NOTE - ATTENDING CONTRIBUTION TO CARE
52 yo M PMHx noted including a fib, sleep apnea presents with c/o some chest pressure, fast heart rate today,  States that his fit bit had readings to 150.  pt reports last episode of a fib was April.  He completed 4 week coarse of Xarelto at that time.  no SOB.  On exam pt in NAD AAO x 3, Lungs CAT B/L no wrr, + tachy, irregular, no edema, abd isosft nt nd

## 2018-07-07 NOTE — CONSULT NOTE ADULT - ASSESSMENT
Paroxysmal Atrial Fibrillation/ ESTEFANIA    - Admit to tele  - cardizem drip/ heparin or lovenox  - CE X2 q 8h  - TSH  - ASA  - CARDIO EVAL  - NEEDS NPSG WITH ORAL APPLIANCE AS OP

## 2018-07-07 NOTE — ED ADULT NURSE REASSESSMENT NOTE - NS ED NURSE REASSESS COMMENT FT1
Pt received AAOx3 breathing RA 96% w/ normal WOB; wife at bedside. Pt denies any pain/discomfort. Pt verbalized feeling better "99%". IV Cardizem infusing well to site at 15mg/hr. Continued monitoring. Pt received AAOx3 breathing RA 96% w/ normal WOB; wife at bedside. Pt denies any pain/discomfort. Pt verbalized feeling better "99%". HR fluctuating  bpm. IV Cardizem infusing well to site at 15mg/hr. Continued monitoring.

## 2018-07-07 NOTE — ED ADULT TRIAGE NOTE - CHIEF COMPLAINT QUOTE
Patient complaining of mid sternal chest pressure and feels like his heart is beating fast. Patient also complaining of shortness of breath.

## 2018-07-07 NOTE — ED PROVIDER NOTE - OBJECTIVE STATEMENT
52 yo male with h/o HTN, sleep apnea, paroxysmal a fib presents to the ED stating his heart rate is fast and is concerned he is in A fib again. Patient explains last time he was in ED in April 2018 for a fib patient was started on PO Cardizem and xarelto (4 weeks then changed to aspirin). Patient states he was feeling right last night and today. Patient checked his HR and his heart rate was fast and irregular. Patient's fit bit showed a heart rate in the 150s. Associated with mild chest pressure. Denies fever, chills, headache, dizziness, syncope, sob, abdominal pain, N/V, UE/LE weakness or paresthesias. Patient with 2 episodes of A fib prior to today. Cardiologist: Dr. King, pmd: Dr. Barron.

## 2018-07-07 NOTE — ED PROVIDER NOTE - NS ED ROS FT
Constitutional: no fever, chills or generalized weakness  Eyes: no visual changes, no eye pain  ENT: no URI sxs, no throat pain, no change in voice, no excessive drooling, no ear pain/discharge, no hearing changes.   Cardiovascular: + chest pressure, + rapid a fib. no sob, no syncope , no palpitations, no peripheral edema  Respiratory: no cough, no shortness of breath  Gastrointestinal: no nausea, vomiting or abd pain  Musculoskeletal: no msk pain or injury. no neck pain, no back pain, no joint pain.    Integumentary: no rash or skin changes. no edema  Neurological: no headache, no dizziness, no visual changes, no UE/LE weakness or paresthesias. no change in mental status. no syncope.

## 2018-07-08 LAB
CK SERPL-CCNC: 129 U/L — SIGNIFICANT CHANGE UP (ref 0–225)
TROPONIN T SERPL-MCNC: <0.01 NG/ML — SIGNIFICANT CHANGE UP
TSH SERPL-MCNC: 1.99 UIU/ML — SIGNIFICANT CHANGE UP (ref 0.27–4.2)

## 2018-07-08 RX ORDER — PROCAINAMIDE HCL 500 MG
2 TABLET, EXTENDED RELEASE ORAL
Qty: 2000 | Refills: 0 | Status: DISCONTINUED | OUTPATIENT
Start: 2018-07-08 | End: 2018-07-08

## 2018-07-08 RX ORDER — PROCAINAMIDE HCL 500 MG
300 TABLET, EXTENDED RELEASE ORAL ONCE
Qty: 0 | Refills: 0 | Status: COMPLETED | OUTPATIENT
Start: 2018-07-08 | End: 2018-07-08

## 2018-07-08 RX ORDER — DILTIAZEM HCL 120 MG
180 CAPSULE, EXT RELEASE 24 HR ORAL DAILY
Qty: 0 | Refills: 0 | Status: DISCONTINUED | OUTPATIENT
Start: 2018-07-08 | End: 2018-07-09

## 2018-07-08 RX ORDER — ACETAMINOPHEN 500 MG
650 TABLET ORAL EVERY 6 HOURS
Qty: 0 | Refills: 0 | Status: DISCONTINUED | OUTPATIENT
Start: 2018-07-08 | End: 2018-07-09

## 2018-07-08 RX ADMIN — ENOXAPARIN SODIUM 110 MILLIGRAM(S): 100 INJECTION SUBCUTANEOUS at 17:26

## 2018-07-08 RX ADMIN — Medication 180 MILLIGRAM(S): at 19:33

## 2018-07-08 RX ADMIN — VALSARTAN 160 MILLIGRAM(S): 80 TABLET ORAL at 22:09

## 2018-07-08 RX ADMIN — Medication 30 MG/MIN: at 09:00

## 2018-07-08 RX ADMIN — ENOXAPARIN SODIUM 110 MILLIGRAM(S): 100 INJECTION SUBCUTANEOUS at 06:32

## 2018-07-08 RX ADMIN — Medication 506 MILLIGRAM(S): at 08:00

## 2018-07-08 RX ADMIN — Medication 5 MG/HR: at 01:33

## 2018-07-08 RX ADMIN — Medication 650 MILLIGRAM(S): at 23:00

## 2018-07-08 RX ADMIN — Medication 650 MILLIGRAM(S): at 22:09

## 2018-07-08 NOTE — CONSULT NOTE ADULT - ASSESSMENT
Patient with afib probably secondary ESTEFANIA. Will try Pronestyl today to convert. He needs out patient sleep study

## 2018-07-08 NOTE — CONSULT NOTE ADULT - SUBJECTIVE AND OBJECTIVE BOX
CARDIOLOGY CONSULT NOTE     CHIEF COMPLAINT/REASON FOR CONSULT:    HPI: 52 yo male. History HTN ESTEFANIA uses dental appliance. He had afib in past. Off Xarelto because of back pain. He awoke Fri did not feel well palpitations. No chest pain or sob      PAST MEDICAL & SURGICAL HISTORY:  Atrial fibrillation: paroxysmal  Sleep apnea  Hypertension, unspecified type  History of appendectomy  Absence of gallbladder      Cardiac Risks:   [ x]HTN, [ ] DM, [ ] Smoking, [ ] FH,  [ ] Lipids        MEDICATIONS:  MEDICATIONS  (STANDING):  diltiazem Infusion 5 mG/Hr (5 mL/Hr) IV Continuous <Continuous>  enoxaparin Injectable 110 milliGRAM(s) SubCutaneous two times a day  valsartan 160 milliGRAM(s) Oral at bedtime      FAMILY HISTORY:  No pertinent family history in first degree relatives      SOCIAL HISTORY:      [ ] Marital status    Allergies    No Known Allergies    Intolerances    Percocet 5/325 (Other)  	    REVIEW OF SYSTEMS:  CONSTITUTIONAL: No fever, weight loss, or fatigue  EYES: No eye pain, visual disturbances, or discharge  ENMT:  No difficulty hearing, tinnitus, vertigo; No sinus or throat pain  NECK: No pain or stiffness  RESPIRATORY: No cough, wheezing, chills or hemoptysis; No Shortness of Breath  CARDIOVASCULAR: No chest pain, palpitations, passing out, dizziness, or leg swelling  GASTROINTESTINAL: No abdominal or epigastric pain. No nausea, vomiting, or hematemesis; No diarrhea or constipation. No melena or hematochezia.  GENITOURINARY: No dysuria, frequency, hematuria, or incontinence  NEUROLOGICAL: No headaches, memory loss, loss of strength, numbness, or tremors  SKIN: No itching, burning, rashes, or lesions   	        PHYSICAL EXAM:  T(C): 36.6 (07-07-18 @ 23:01), Max: 37 (07-07-18 @ 21:20)  HR: 103 (07-07-18 @ 23:15) (103 - 159)  BP: 118/73 (07-07-18 @ 23:15) (112/64 - 167/97)  RR: 18 (07-07-18 @ 23:01) (18 - 18)  SpO2: 96% (07-07-18 @ 23:15) (93% - 98%)  Wt(kg): --  I&O's Summary    07 Jul 2018 07:01 - 08 Jul 2018 06:36  --------------------------------------------------------  IN: 135 mL / OUT: 550 mL / NET: -415 mL        Appearance: Normal	  Psychiatry: A & O x 3, Mood & affect appropriate  HEENT:   Normal oral mucosa, PERRL, EOMI	  Lymphatic: No lymphadenopathy  Cardiovascular: Normal S1 S2 irreg, No JVD, No murmurs  Respiratory: Lungs clear to auscultation	  Gastrointestinal:  Soft, Non-tender, + BS	  Skin: No rashes, No ecchymoses, No cyanosis	  Neurologic: Non-focal  Extremities: Normal range of motion, No clubbing, cyanosis or edema  Vascular: Peripheral pulses palpable 2+ bilaterally      ECG:  	afib    	  LABS:	 	    CARDIAC MARKERS:                                    16.3   8.24  )-----------( 314      ( 07 Jul 2018 17:26 )             48.6     07-07    141  |  102  |  14  ----------------------------<  104<H>  4.5   |  26  |  1.1    Ca    9.1      07 Jul 2018 17:26      PT/INR - ( 07 Jul 2018 17:26 )   PT: 11.40 sec;   INR: 1.05 ratio         PTT - ( 07 Jul 2018 17:26 )  PTT:30.9 sec

## 2018-07-08 NOTE — H&P ADULT - NSHPLABSRESULTS_GEN_ALL_CORE
16.3   8.24  )-----------( 314      ( 07 Jul 2018 17:26 )             48.6       07-07    141  |  102  |  14  ----------------------------<  104<H>  4.5   |  26  |  1.1    Ca    9.1      07 Jul 2018 17:26                    PT/INR - ( 07 Jul 2018 17:26 )   PT: 11.40 sec;   INR: 1.05 ratio         PTT - ( 07 Jul 2018 17:26 )  PTT:30.9 sec    Lactate Trend      CARDIAC MARKERS ( 07 Jul 2018 22:15 )  x     / <0.01 ng/mL / 140 U/L / x     / x      CARDIAC MARKERS ( 07 Jul 2018 17:26 )  x     / <0.01 ng/mL / 170 U/L / x     / 1.7 ng/mL

## 2018-07-08 NOTE — H&P ADULT - NSHPPHYSICALEXAM_GEN_ALL_CORE
T(C): 36.6 (07-07-18 @ 23:01), Max: 37 (07-07-18 @ 21:20)  HR: 103 (07-07-18 @ 23:15) (103 - 159)  BP: 118/73 (07-07-18 @ 23:15) (112/64 - 167/97)  RR: 18 (07-07-18 @ 23:01) (18 - 18)  SpO2: 96% (07-07-18 @ 23:15) (93% - 98%)    PHYSICAL EXAM:  GENERAL: NAD, well-developed  HEAD:  Atraumatic, Normocephalic  EYES: EOMI, PERRLA, conjunctiva and sclera clear  ENT:No nasal obstruction or discharge. No tonsillar exudate, swelling or erythema.  NECK: Supple, No JVD  CHEST/LUNG: Clear to auscultation bilaterally; No wheeze  HEART: Regular rate and rhythm; No murmurs, rubs, or gallops  ABDOMEN: Soft, Nontender, Nondistended; Bowel sounds present  EXTREMITIES:  2+ Peripheral Pulses, No clubbing, cyanosis, or edema  PSYCH: AAOx3  NEUROLOGY: non-focal  SKIN: No rashes or lesions

## 2018-07-08 NOTE — H&P ADULT - ATTENDING COMMENTS
Patient seen and evaluated independently medical resident note reviewed, I agree with plan and management, except as I have noted. Afib possibly secondary to partially untreated ESTEFANIA. Pt instructed to repeat sleep study as out patient

## 2018-07-08 NOTE — H&P ADULT - ASSESSMENT
54 y/o male with PMHx of HTN, ESTEFANIA and paroxysmal Afib p/w with Afib w RVR      #Afib with RVR  CHADsVasc of 1   on Cardizem drip and procainamide drip  on lovenox BID  f/u cardio eval    #HTN  c/w valsartan   hold norvasc for now as patient is on cardizem drip    #others  DASH diet  DVT PPX> Lovenox   From home  Full code

## 2018-07-08 NOTE — H&P ADULT - HISTORY OF PRESENT ILLNESS
52 y/o male with PMHx of HTN, OSD on mouth piece device and paroxysmal afib p/w rapid heart rate. patient wasn't feeling well , he took his pulse and he was found himself tachycardiac and came to the ED. patient was found to have Afib with RVR in the ED. patient was admitted two time before for afib with RVR in February and April. Patient explains last time he was in ED in April 2018 for a fib patient was started on PO Cardizem and xarelto (4 weeks then changed to aspirin stated also xarelto gave him back pain). he denies fever, chills, headache, dizziness, syncope, sob, abdominal pain, N/V, UE/LE weakness or paresthesias.  In the ED patient was started on cardizem drip and given 1 dose of Lovenox 54 y/o male with PMHx of HTN, ESTEFANIA on mouth piece device and paroxysmal afib p/w rapid heart rate. patient wasn't feeling well , he took his pulse and he was found himself tachycardiac and came to the ED. patient was found to have Afib with RVR in the ED. patient was admitted two time before for afib with RVR in February and April. Patient explains last time he was in ED in April 2018 for a fib patient was started on PO Cardizem and xarelto (4 weeks then changed to aspirin stated also xarelto gave him back pain). he denies fever, chills, headache, dizziness, syncope, sob, abdominal pain, N/V, UE/LE weakness or paresthesias.  In the ED patient was started on cardizem drip and given 1 dose of Lovenox

## 2018-07-09 ENCOUNTER — TRANSCRIPTION ENCOUNTER (OUTPATIENT)
Age: 53
End: 2018-07-09

## 2018-07-09 VITALS — RESPIRATION RATE: 16 BRPM | OXYGEN SATURATION: 94 %

## 2018-07-09 RX ORDER — APIXABAN 2.5 MG/1
1 TABLET, FILM COATED ORAL
Qty: 60 | Refills: 0
Start: 2018-07-09 | End: 2018-08-07

## 2018-07-09 RX ORDER — AMLODIPINE BESYLATE 2.5 MG/1
0 TABLET ORAL
Qty: 0 | Refills: 0 | COMMUNITY

## 2018-07-09 RX ORDER — DILTIAZEM HCL 120 MG
1 CAPSULE, EXT RELEASE 24 HR ORAL
Qty: 30 | Refills: 0 | OUTPATIENT
Start: 2018-07-09

## 2018-07-09 RX ORDER — AMLODIPINE BESYLATE 2.5 MG/1
1 TABLET ORAL
Qty: 30 | Refills: 0
Start: 2018-07-09

## 2018-07-09 RX ORDER — APIXABAN 2.5 MG/1
1 TABLET, FILM COATED ORAL
Qty: 60 | Refills: 0 | OUTPATIENT
Start: 2018-07-09 | End: 2018-08-07

## 2018-07-09 RX ORDER — ASPIRIN/CALCIUM CARB/MAGNESIUM 324 MG
1 TABLET ORAL
Qty: 0 | Refills: 0 | COMMUNITY

## 2018-07-09 RX ORDER — ASPIRIN/CALCIUM CARB/MAGNESIUM 324 MG
1 TABLET ORAL
Qty: 30 | Refills: 0 | OUTPATIENT
Start: 2018-07-09 | End: 2018-08-07

## 2018-07-09 RX ORDER — AMLODIPINE BESYLATE 2.5 MG/1
1 TABLET ORAL
Qty: 30 | Refills: 0 | OUTPATIENT
Start: 2018-07-09

## 2018-07-09 NOTE — PROGRESS NOTE ADULT - SUBJECTIVE AND OBJECTIVE BOX
Patient converted to nsr on iv Pronestyl, now has no complaints       T(F): 97.6 (07-09-18 @ 07:01), Max: 97.8 (07-08-18 @ 15:12)  HR: 65 (07-09-18 @ 07:09)  BP: 136/93 (07-09-18 @ 07:09)  RR: 16 (07-09-18 @ 07:35)  SpO2: 95% (07-09-18 @ 07:35) (95% - 95%)    PHYSICAL EXAM:  GENERAL: NAD  HEAD:  Atraumatic, Normocephalic  NERVOUS SYSTEM:  Alert & Oriented X3, no focal deficits  CHEST/LUNG: Clear to percussion bilaterally; No rales, rhonchi, wheezing, or rubs  HEART: Regular rate and rhythm; No murmurs, rubs, or gallops  ABDOMEN: Soft, Nontender, Nondistended; Bowel sounds present  EXTREMITIES:  2+ Peripheral Pulses, No clubbing, cyanosis, or edema  LYMPH: No lymphadenopathy noted  SKIN: No rashes or lesions    LABS  07-07    141  |  102  |  14  ----------------------------<  104<H>  4.5   |  26  |  1.1    Ca    9.1      07 Jul 2018 17:26                            16.3   8.24  )-----------( 314      ( 07 Jul 2018 17:26 )             48.6     PT/INR - ( 07 Jul 2018 17:26 )   PT: 11.40 sec;   INR: 1  PTT - ( 07 Jul 2018 17:26 )  PTT:30.9 sec    CARDIAC ENZYMES  Creatine Kinase, Serum: 129 (07-08 @ 08:32)  Creatine Kinase, Serum: 140 (07-07 @ 22:15)  Creatine Kinase, Serum: 170 (07-07 @ 17:26)    CKMB Units: 1.7 (07-07 @ 17:26)    Troponin T, Serum: <0.01 ng/mL (07-08-18 @ 08:32)  Troponin T, Serum: <0.01 ng/mL (07-07-18 @ 22:15)  Troponin T, Serum: <0.01 ng/mL (07-07-18 @ 17:26)        RADIOLOGY    MEDICATIONS  (STANDING):  diltiazem    milliGRAM(s) Oral daily  enoxaparin Injectable 110 milliGRAM(s) SubCutaneous two times a day  valsartan 160 milliGRAM(s) Oral at bedtime    MEDICATIONS  (PRN):  acetaminophen   Tablet. 650 milliGRAM(s) Oral every 6 hours PRN Mild Pain (1 - 3)

## 2018-07-09 NOTE — DISCHARGE NOTE ADULT - HOSPITAL COURSE
52 y/o male with PMHx of HTN, ESTEFANIA on mouth piece device and paroxysmal afib p/w rapid heart rate. Similar episodes have happened in the past, likely triggered by uncontrolled estefania. Pt has failed multiple cpap masks in the past and currently uses a sleep device. His afib was converted with IV pronestyl and currently at time of d/c pt is in sinus. Rate controlled with cardizem cd. He will be d/c on eliquis for 1 month period. Pt educated to follow up with Cardio and Pulmonary.

## 2018-07-09 NOTE — DISCHARGE NOTE ADULT - PATIENT PORTAL LINK FT
You can access the [facility] Patient Portal, offered by [facility], by registering with the following website: [email or web address]

## 2018-07-09 NOTE — PROGRESS NOTE ADULT - ASSESSMENT
Patient converted to NSR with IV Pronestyl . Ambulate. He needs a sleep study as outpatient . Consider  3 to 4 weeks of Eliquis. SUZAN vasc score 1. If continues PAF after RX ESTEFANIA consider ablation

## 2018-07-09 NOTE — DISCHARGE NOTE ADULT - PLAN OF CARE
medical management Heart rate control with cardizem and anticoagulation for 1 month with eliquis. Follow up with Dr Brunson in 1-2 weeks. Please seek immediate medical attention if there is any return of rapid heart rate. further workup Afib is being triggered by sleep apnea. Patient has failed cpap mask multiple times in past. He needs further followup with Dr Stephan Newby from pulmonary for further sleep studies in 1-2 weeks. Currently pt uses a sleep device, but it might not be well fitted.

## 2018-07-09 NOTE — PROGRESS NOTE ADULT - SUBJECTIVE AND OBJECTIVE BOX
Patient is a 53y old  with no complaints      T(F): 96.6 (07-09-18 @ 05:42), Max: 97.8 (07-08-18 @ 15:12)  HR: 61 (07-09-18 @ 05:42)  BP: 132/88 (07-09-18 @ 05:42)  RR: 20 (07-09-18 @ 05:42)  SpO2: 94% (07-08-18 @ 07:10) (94% - 94%)    PHYSICAL EXAM:  GENERAL: NAD, well-groomed, well-developed  HEAD:  Atraumatic, Normocephalic  EYES: EOMI, PERRLA, conjunctiva and sclera clear  ENMT: No tonsillar erythema, exudates, or enlargement; Moist mucous membranes, Good dentition, No lesions  NECK: Supple, No JVD, Normal thyroid  NERVOUS SYSTEM:  Alert & Oriented X3,  Motor Strength 5/5 B/L upper and lower extremities  CHEST/LUNG: Clear to percussion bilaterally; No rales, rhonchi, wheezing, or rubs  HEART: Regular rate and rhythm; No murmurs, rubs, or gallops  ABDOMEN: Soft, Nontender, Nondistended; Bowel sounds present  EXTREMITIES:   No clubbing, cyanosis, or edema  LYMPH: No lymphadenopathy noted  SKIN: No rashes or lesions    labs  07-07    141  |  102  |  14  ----------------------------<  104<H>  4.5   |  26  |  1.1    Ca    9.1      07 Jul 2018 17:26                            16.3   8.24  )-----------( 314      ( 07 Jul 2018 17:26 )             48.6       PT/INR - ( 07 Jul 2018 17:26 )   PT: 11.40 sec;   INR: 1.05 ratio         PTT - ( 07 Jul 2018 17:26 )  PTT:30.9 sec    Creatine Kinase, Serum: 129 U/L (07-08-18 @ 08:32)  Troponin T, Serum: <0.01 ng/mL (07-08-18 @ 08:32)      acetaminophen   Tablet. 650 milliGRAM(s) Oral every 6 hours PRN  diltiazem    milliGRAM(s) Oral daily  enoxaparin Injectable 110 milliGRAM(s) SubCutaneous two times a day  valsartan 160 milliGRAM(s) Oral at bedtime

## 2018-07-09 NOTE — DISCHARGE NOTE ADULT - MEDICATION SUMMARY - MEDICATIONS TO TAKE
I will START or STAY ON the medications listed below when I get home from the hospital:    valsartan 160 mg oral capsule  -- Indication: For htn    dilTIAZem 180 mg/24 hours oral capsule, extended release  -- 1 cap(s) by mouth once a day  -- Indication: For afib    Eliquis 5 mg oral tablet  -- 1 tab(s) by mouth 2 times a day   -- Check with your doctor before becoming pregnant.  It is very important that you take or use this exactly as directed.  Do not skip doses or discontinue unless directed by your doctor.  Obtain medical advice before taking any non-prescription drugs as some may affect the action of this medication.    -- Indication: For afib    amLODIPine 5 mg oral tablet  -- 1 tab(s) by mouth once a day   -- Indication: For htn

## 2018-07-09 NOTE — DISCHARGE NOTE ADULT - CARE PLAN
Principal Discharge DX:	Rapid atrial fibrillation  Goal:	medical management  Assessment and plan of treatment:	Heart rate control with cardizem and anticoagulation for 1 month with eliquis. Follow up with Dr Brunson in 1-2 weeks. Please seek immediate medical attention if there is any return of rapid heart rate.  Secondary Diagnosis:	Sleep apnea  Goal:	further workup  Assessment and plan of treatment:	Afib is being triggered by sleep apnea. Patient has failed cpap mask multiple times in past. He needs further followup with Dr Stephan Newby from pulmonary for further sleep studies in 1-2 weeks. Currently pt uses a sleep device, but it might not be well fitted.

## 2018-07-09 NOTE — PROGRESS NOTE ADULT - ASSESSMENT
52 y/o male with PMHx of HTN, ESTEFANIA and paroxysmal Afib p/w with Afib w RVR      #Afib with RVR  CHADsVasc of 1   converted to NSR after IV Pronestyl  may dc home today on apixaban   f/u with cardio as out patient  repeat sleep study as out patient  for possible treatment of Obstructive sleep apnea   34 min spent on dc    #HTN  continue home meds

## 2018-07-09 NOTE — DISCHARGE NOTE ADULT - CARE PROVIDER_API CALL
Jose Francisco Osei), Critical Care Medicine; Internal Medicine; Pulmonary Disease; Sleep Medicine  [address] [place] [address]  Phone: [phone]  Fax: [phone]    Jeremiah Brunson), Cardiovascular Disease; Internal Medicine; Interventional Cardiology; Nuclear Cardiology  [address] [place] [address]  Phone: [phone]  Fax: [phone]

## 2018-07-13 DIAGNOSIS — Z79.82 LONG TERM (CURRENT) USE OF ASPIRIN: ICD-10-CM

## 2018-07-13 DIAGNOSIS — I48.91 UNSPECIFIED ATRIAL FIBRILLATION: ICD-10-CM

## 2018-07-13 DIAGNOSIS — I48.0 PAROXYSMAL ATRIAL FIBRILLATION: ICD-10-CM

## 2018-07-13 DIAGNOSIS — I10 ESSENTIAL (PRIMARY) HYPERTENSION: ICD-10-CM

## 2018-07-13 DIAGNOSIS — G47.33 OBSTRUCTIVE SLEEP APNEA (ADULT) (PEDIATRIC): ICD-10-CM

## 2018-07-17 ENCOUNTER — INPATIENT (INPATIENT)
Facility: HOSPITAL | Age: 53
LOS: 0 days | Discharge: HOME | End: 2018-07-18
Attending: INTERNAL MEDICINE | Admitting: INTERNAL MEDICINE

## 2018-07-17 VITALS
WEIGHT: 250 LBS | OXYGEN SATURATION: 98 % | HEART RATE: 85 BPM | SYSTOLIC BLOOD PRESSURE: 137 MMHG | DIASTOLIC BLOOD PRESSURE: 106 MMHG | TEMPERATURE: 98 F | RESPIRATION RATE: 19 BRPM

## 2018-07-17 DIAGNOSIS — I48.91 UNSPECIFIED ATRIAL FIBRILLATION: ICD-10-CM

## 2018-07-17 DIAGNOSIS — Z98.890 OTHER SPECIFIED POSTPROCEDURAL STATES: Chronic | ICD-10-CM

## 2018-07-17 DIAGNOSIS — Z90.49 ACQUIRED ABSENCE OF OTHER SPECIFIED PARTS OF DIGESTIVE TRACT: Chronic | ICD-10-CM

## 2018-07-17 DIAGNOSIS — G47.30 SLEEP APNEA, UNSPECIFIED: ICD-10-CM

## 2018-07-17 DIAGNOSIS — I10 ESSENTIAL (PRIMARY) HYPERTENSION: ICD-10-CM

## 2018-07-17 LAB
ALBUMIN SERPL ELPH-MCNC: 4.4 G/DL — SIGNIFICANT CHANGE UP (ref 3.5–5.2)
ALP SERPL-CCNC: 52 U/L — SIGNIFICANT CHANGE UP (ref 30–115)
ALT FLD-CCNC: 37 U/L — SIGNIFICANT CHANGE UP (ref 0–41)
ANION GAP SERPL CALC-SCNC: 12 MMOL/L — SIGNIFICANT CHANGE UP (ref 7–14)
APTT BLD: 34 SEC — SIGNIFICANT CHANGE UP (ref 27–39.2)
AST SERPL-CCNC: 17 U/L — SIGNIFICANT CHANGE UP (ref 0–41)
BILIRUB SERPL-MCNC: 0.4 MG/DL — SIGNIFICANT CHANGE UP (ref 0.2–1.2)
BUN SERPL-MCNC: 13 MG/DL — SIGNIFICANT CHANGE UP (ref 10–20)
CALCIUM SERPL-MCNC: 9.5 MG/DL — SIGNIFICANT CHANGE UP (ref 8.5–10.1)
CHLORIDE SERPL-SCNC: 103 MMOL/L — SIGNIFICANT CHANGE UP (ref 98–110)
CK MB CFR SERPL CALC: 1.4 NG/ML — SIGNIFICANT CHANGE UP (ref 0.6–6.3)
CK SERPL-CCNC: 123 U/L — SIGNIFICANT CHANGE UP (ref 0–225)
CO2 SERPL-SCNC: 27 MMOL/L — SIGNIFICANT CHANGE UP (ref 17–32)
CREAT SERPL-MCNC: 0.9 MG/DL — SIGNIFICANT CHANGE UP (ref 0.7–1.5)
GLUCOSE SERPL-MCNC: 145 MG/DL — HIGH (ref 70–99)
HCT VFR BLD CALC: 46.2 % — SIGNIFICANT CHANGE UP (ref 42–52)
HGB BLD-MCNC: 15.4 G/DL — SIGNIFICANT CHANGE UP (ref 14–18)
INR BLD: 1.28 RATIO — SIGNIFICANT CHANGE UP (ref 0.65–1.3)
MCHC RBC-ENTMCNC: 31.3 PG — HIGH (ref 27–31)
MCHC RBC-ENTMCNC: 33.3 G/DL — SIGNIFICANT CHANGE UP (ref 32–37)
MCV RBC AUTO: 93.9 FL — SIGNIFICANT CHANGE UP (ref 80–94)
NRBC # BLD: 0 /100 WBCS — SIGNIFICANT CHANGE UP (ref 0–0)
PLATELET # BLD AUTO: 305 K/UL — SIGNIFICANT CHANGE UP (ref 130–400)
POTASSIUM SERPL-MCNC: 4.7 MMOL/L — SIGNIFICANT CHANGE UP (ref 3.5–5)
POTASSIUM SERPL-SCNC: 4.7 MMOL/L — SIGNIFICANT CHANGE UP (ref 3.5–5)
PROT SERPL-MCNC: 7.1 G/DL — SIGNIFICANT CHANGE UP (ref 6–8)
PROTHROM AB SERPL-ACNC: 13.9 SEC — HIGH (ref 9.95–12.87)
RBC # BLD: 4.92 M/UL — SIGNIFICANT CHANGE UP (ref 4.7–6.1)
RBC # FLD: 12.6 % — SIGNIFICANT CHANGE UP (ref 11.5–14.5)
SODIUM SERPL-SCNC: 142 MMOL/L — SIGNIFICANT CHANGE UP (ref 135–146)
TROPONIN T SERPL-MCNC: <0.01 NG/ML — SIGNIFICANT CHANGE UP
WBC # BLD: 5.94 K/UL — SIGNIFICANT CHANGE UP (ref 4.8–10.8)
WBC # FLD AUTO: 5.94 K/UL — SIGNIFICANT CHANGE UP (ref 4.8–10.8)

## 2018-07-17 RX ORDER — VALSARTAN 80 MG/1
160 TABLET ORAL DAILY
Qty: 0 | Refills: 0 | Status: DISCONTINUED | OUTPATIENT
Start: 2018-07-17 | End: 2018-07-18

## 2018-07-17 RX ORDER — PROCAINAMIDE HCL 500 MG
2 TABLET, EXTENDED RELEASE ORAL
Qty: 2000 | Refills: 0 | Status: DISCONTINUED | OUTPATIENT
Start: 2018-07-17 | End: 2018-07-18

## 2018-07-17 RX ORDER — PROCAINAMIDE HCL 500 MG
300 TABLET, EXTENDED RELEASE ORAL ONCE
Qty: 0 | Refills: 0 | Status: COMPLETED | OUTPATIENT
Start: 2018-07-17 | End: 2018-07-17

## 2018-07-17 RX ORDER — APIXABAN 2.5 MG/1
5 TABLET, FILM COATED ORAL EVERY 12 HOURS
Qty: 0 | Refills: 0 | Status: DISCONTINUED | OUTPATIENT
Start: 2018-07-17 | End: 2018-07-18

## 2018-07-17 RX ORDER — SODIUM CHLORIDE 9 MG/ML
1000 INJECTION INTRAMUSCULAR; INTRAVENOUS; SUBCUTANEOUS
Qty: 0 | Refills: 0 | Status: DISCONTINUED | OUTPATIENT
Start: 2018-07-17 | End: 2018-07-17

## 2018-07-17 RX ADMIN — Medication 30 MG/MIN: at 21:18

## 2018-07-17 RX ADMIN — Medication 506 MILLIGRAM(S): at 13:07

## 2018-07-17 RX ADMIN — SODIUM CHLORIDE 125 MILLILITER(S): 9 INJECTION INTRAMUSCULAR; INTRAVENOUS; SUBCUTANEOUS at 13:07

## 2018-07-17 RX ADMIN — VALSARTAN 160 MILLIGRAM(S): 80 TABLET ORAL at 18:37

## 2018-07-17 RX ADMIN — APIXABAN 5 MILLIGRAM(S): 2.5 TABLET, FILM COATED ORAL at 18:38

## 2018-07-17 RX ADMIN — Medication 30 MG/MIN: at 13:41

## 2018-07-17 NOTE — ED PROVIDER NOTE - PHYSICAL EXAMINATION
Gen: Alert, NAD, well appearing  Head: NC, AT, PERRL, EOMI, normal lids/conjunctiva  ENT: normal hearing, patent oropharynx  Neck: +supple, no tenderness/meningismus,  Pulm: Bilateral BS, normal resp effort, no wheeze/stridor/retractions  CV: S1S2, tachy, irregularly irregular  Abd: soft, NT/ND  Mskel: no edema/erythema/cyanosis  Skin: no rash, warm/dry  Neuro: AAOx3, no sensory/motor deficits

## 2018-07-17 NOTE — H&P ADULT - NSHPREVIEWOFSYSTEMS_GEN_ALL_CORE
CONSTITUTIONAL: No weakness, fevers or chills  EYES/ENT: No visual changes;  No vertigo or throat pain   NECK: No pain or stiffness  RESPIRATORY: No cough, wheezing, hemoptysis; No shortness of breath  CARDIOVASCULAR: yes palpitations  GASTROINTESTINAL: No abdominal or epigastric pain. No nausea, vomiting, or hematemesis; No diarrhea or constipation. No melena or hematochezia.  GENITOURINARY: No dysuria, frequency or hematuria  NEUROLOGICAL: No numbness or weakness  SKIN: No itching, rashes

## 2018-07-17 NOTE — H&P ADULT - NSHPLABSRESULTS_GEN_ALL_CORE
LABS  07-17    142  |  103  |  13  ----------------------------<  145<H>  4.7   |  27  |  0.9    Ca    9.5      17 Jul 2018 11:40    TPro  7.1  /  Alb  4.4  /  TBili  0.4  /  DBili  x   /  AST  17  /  ALT  37  /  AlkPhos  52  07-17                          15.4   5.94  )-----------( 305      ( 17 Jul 2018 11:40 )             46.2       PT/INR - ( 17 Jul 2018 11:40 )   PT: 13.90 sec;   INR: 1.28 ratio         PTT - ( 17 Jul 2018 11:40 )  PTT:34.0 sec    CARDIAC ENZYMES    Troponin T, Serum (07.17.18 @ 11:40)    Troponin T, Serum: <0.01 ng/mL    < from: 12 Lead ECG (07.17.18 @ 11:11) >    Ventricular Rate 146 BPM    Atrial Rate 153 BPM    QRS Duration 84 ms    Q-T Interval 268 ms    QTC Calculation(Bezet) 417 ms    R Axis -1 degrees    T Axis 42 degrees    Diagnosis Line Atrial fibrillation with rapid ventricular response  Abnormal ECG    < end of copied text >    < from: Xray Chest 1 View-PORTABLE IMMEDIATE (07.17.18 @ 11:47) >    Impression:      No radiographic evidence of acute cardiopulmonary disease.    < end of copied text >

## 2018-07-17 NOTE — ED PROVIDER NOTE - NS ED ROS FT
Review of Systems    Constitutional: (-) fever  Eyes/ENT: (-) blurry vision, (-) epistaxis  Cardiovascular: (+)palpitations, (-) chest pain, (-) syncope  Respiratory: (-) cough, (-) shortness of breath  Gastrointestinal: (-) vomiting, (-) diarrhea  Musculoskeletal: (-) neck pain, (-) back pain, (-) joint pain  Integumentary: (-) rash, (-) edema  Neurological: (-) headache, (-) altered mental status  Psychiatric: (-) hallucinations  Allergic/Immunologic: (-) pruritus

## 2018-07-17 NOTE — PATIENT PROFILE ADULT. - ACCEPTABLE
Enfermedades de transmisión sexual  (Sexually Transmitted Disease)  José Miguel enfermedad de transmisión sexual es toda infección que se contagia de josé miguel persona a otra yasemin la actividad sexual. Puede transmitirse a través de la saliva, el semen, la kit, el moco vaginal o la orina. Las enfermedades de transmisión sexual más frecuentes son:  · Gonorrea.  · Clamidia.  · Sífilis.  · VIH/SIDA.  · Herpes genital.  · Hepatitis B y C.  · Tricomonas.  · Virus del papiloma humano (VPH).  · Piojos púbicos.  CAUSAS  Estas enfermedades se transmiten por bacterias, virus o parásitos. El contagio se realiza por:   · Contacto sexual con un compañero infectado.  · Compartir juguetes sexuales.  · Compartir agujas.  · Tener un contacto íntimo con los genitales, la boca, o la yaw anal de josé miguel persona infectada.  SÍNTOMAS  Algunas personas no presentan síntomas, josé miguel pueden transmitir la infección a otras personas. Las distintas enfermedades de transmisión sexual tienen diferentes síntomas. Los síntomas pueden ser:  · Micción dolorosa o con kit.  · Dolor en la pelvis, el abdomen, la vagina, el ano, la garganta o los ojos.  · Erupción en la piel, picazón, irritación, bultos o lesiones (llagas, úlceras). Generalmente aparecen en la yaw genital o anal.  · Secreción vaginal anormal.  · En los hombres, secreción peniana.  · Bultos blandos y de color en la yaw genital o anal.  · Fiebre.  · Dolor o hemorragias yasemin las relaciones sexuales.  · Inflamación de los ganglios en la yaw de la neo.  · Piel y ojos amarillos, que se observan yasemin la hepatitis (ictericia).  DIAGNÓSTICO  Para efectuar un diagnóstico el médico:   · Realizará josé miguel historia clínica.  · Hará un examen pélvico  · Tomará josé miguel muestra (cultivo) para examinar.  · Examinará josé miguel muestra de la secreción en el microscopio.  · Le pedirá análisis de kit.  · Si corresponde, le tomará la muestra para un Papanicolau.  · Hará josé miguel colposcopía.  · Hará josé miguel  laparoscopía.  TRATAMIENTO  · C lamidia, gonorrea, tricomonas y sífilis pueden curarse con antibióticos  · El herpes genital, la hepatitis y el VIH pueden tratarse, josé miguel no curarse, con los medicamentos prescriptos. Estos medicamentos aliviarán los síntomas.  · Las verrugas genitales en el VPH se extirpan utilizando medicamentos, congelamiento, quemado (electrocauterización) o cirugía. Las verrugas pueden reaparecer.  · El VPH es un virus y no puede curarse con medicamentos o cirugía. Sin embargo, el médico debe controlar muy yanet las áreas anormales y podrá extirparlas del chitra, la vagina o la vulva en un procedimiento ambulatorio o con cirugía.  Si el diagnóstico se confirma por un cultivo u otro método, laureano compañero sexual más reciente necesita recibir tratamiento Debe realizarlo aunque no tenga problemas o laureano cultivo o evaluación vicki negativos. Tampoco debe tener relaciones sexuales hasta que el profesional lo autorice.   INSTRUCCIONES PARA EL CUIDADO DOMICILIARIO  · Todos los compañeros sexuales deben recibir información, someterse a las pruebas y, si es necesario, tratarse.  · [place] los antibióticos alexus se le indicó. Finalice la prescripción completa, aunque se sienta mejor.  · Solo tome medicamentos que se pueden comprar sin receta o recetados para el dolor, malestar o fiebre, alexus le indica el médico.  · Tommie reposo.  · Debe ingerir josé miguel dieta equilibrada y beber josé miguel gran cantidad de líquido para mantener la orina de maxx sung o color amarillo pálido.  · No mantenga relaciones sexuales hasta completar el tratamiento y haya concurrido a josé miguel visita de seguimiento con el profesional que lo asiste. Luego del tratamiento habrá que evaluar nuevamente la enfermedad de transmisión sexual.  · Cumpla con todas las visitas de control, Papanicolau y análisis de kit según las indicaciones del médico.  · Sólo use condones de latex y lubricantes solubles en agua yasemin la actividad sexual. No utilice vaselina ni  aceites.  · Evite el alcohol y las drogas.  · Aplíquese las vacunas para el VIH y la hepatitis. Si no ha recibido estas vacunas, consulte a laureano médico si debe aplicarse josé miguel o ambas.  · Evite las prácticas sexuales riesgosas que puedan lacerar la piel.  El único modo de evitar las enfermedades de transmisión sexual es evitar toda actividad sexual. Los condones de látex y protectores bucales (para el sexo oral) ayudarán a disminuir el riesgo de contraer josé miguel enfermedad de transmisión sexual. josé miguel no eliminarán completamente el riesgo.   SOLICITE ATENCIÓN MÉDICA SI:   · Tiene fiebre.  · Desarrolla nuevos síntomas o estos empeoran.  Document Released: 09/27/2006 Document Revised: 03/11/2013  [employer]® Patient Information ©2014 [employer].     0

## 2018-07-17 NOTE — H&P ADULT - PROBLEM SELECTOR PLAN 1
now on Pronestyl drip  continue Eliquis  probably secondary to ESTEFANIA  resume oral Cardizem  telemetry and cardio consult

## 2018-07-17 NOTE — H&P ADULT - NSHPPHYSICALEXAM_GEN_ALL_CORE
PHYSICAL EXAM:  Vital Signs Last 24 Hrs  T(C): 36.8 (17 Jul 2018 13:08), Max: 36.9 (17 Jul 2018 11:07)  T(F): 98.2 (17 Jul 2018 13:08), Max: 98.5 (17 Jul 2018 11:07)  HR: 146 (17 Jul 2018 14:14) (85 - 146)  BP: 136/67 (17 Jul 2018 14:14) (111/83 - 139/84)  BP(mean): --  RR: 18 (17 Jul 2018 14:14) (18 - 19)  SpO2: 95% (17 Jul 2018 14:14) (95% - 98%)  GENERAL: NAD, well-groomed, well-developed  HEAD:  Atraumatic, Normocephalic  EYES: EOMI, PERRLA, conjunctiva and sclera clear  ENMT: No tonsillar erythema, exudates, or enlargement; Moist mucous membranes, Good dentition, No lesions  NECK: Supple, No JVD, Normal thyroid  NERVOUS SYSTEM:  Alert & Oriented X3, Good concentration; Motor Strength 5/5 B/L upper and lower extremities; DTRs 2+ intact and symmetric  CHEST/LUNG: Clear to percussion bilaterally; No rales, rhonchi, wheezing, or rubs  HEART: irregular   ABDOMEN: Soft, Nontender, Nondistended; Bowel sounds present  EXTREMITIES:  2+ Peripheral Pulses, No clubbing, cyanosis, or edema  LYMPH: No lymphadenopathy noted  SKIN: No rashes or lesions

## 2018-07-17 NOTE — H&P ADULT - HISTORY OF PRESENT ILLNESS
53 year old male dc'd from telemetry one week ago s/p an episode of atrial fibrillation, he had difficulty with taking the oral Cardizem and  was non compliant with his Eliquis until yesterday returns with palpitations and rapid afib.

## 2018-07-17 NOTE — ED PROVIDER NOTE - OBJECTIVE STATEMENT
54 yo male hx A-fib here for rapid HR. Patient was discharge from hospital for rapid A-fib 8 days ago and was prescribed Eliquis and Cardizem. Patient states he has been taking Cardizem intermittently and missed today's dose. He started his  Eliquis yesterday. Today he felt like his heartbeat was off so he took his pulse and it was high so he came to ED. No CP, SOB, n/v, dizzy, or abdominal pains.

## 2018-07-17 NOTE — ED PROVIDER NOTE - MEDICAL DECISION MAKING DETAILS
Pt presented with rapid afib.  Had conversion with pronestyl last week as inpt.  Conversion attempted in the ED without improvement.  Pt with continued symptoms in the ED despite treatment.  Pt needed to be admitted for continued treatment.

## 2018-07-17 NOTE — ED PROVIDER NOTE - CARE PLAN
Principal Discharge DX:	Syncope Principal Discharge DX:	Afib Principal Discharge DX:	Afib  Secondary Diagnosis:	Tachycardia

## 2018-07-17 NOTE — ED PROVIDER NOTE - PROGRESS NOTE DETAILS
Case d/w Dr. Hawkins (cards) who confirms nl echo last week and rec starting procainamide as ordered previously to attempt conversion. Dr. Antonio accepts admission Pt with continued tachycardia.  Adm for continued eval and treatment.  Cardizem ordered for rate control and care endorsed to adm team.

## 2018-07-18 ENCOUNTER — TRANSCRIPTION ENCOUNTER (OUTPATIENT)
Age: 53
End: 2018-07-18

## 2018-07-18 VITALS — DIASTOLIC BLOOD PRESSURE: 65 MMHG | SYSTOLIC BLOOD PRESSURE: 118 MMHG | HEART RATE: 75 BPM | RESPIRATION RATE: 18 BRPM

## 2018-07-18 RX ORDER — LOSARTAN POTASSIUM 100 MG/1
100 TABLET, FILM COATED ORAL DAILY
Qty: 0 | Refills: 0 | Status: DISCONTINUED | OUTPATIENT
Start: 2018-07-18 | End: 2018-07-18

## 2018-07-18 RX ORDER — METOPROLOL TARTRATE 50 MG
5 TABLET ORAL ONCE
Qty: 0 | Refills: 0 | Status: COMPLETED | OUTPATIENT
Start: 2018-07-18 | End: 2018-07-18

## 2018-07-18 RX ORDER — METOPROLOL TARTRATE 50 MG
25 TABLET ORAL
Qty: 0 | Refills: 0 | Status: DISCONTINUED | OUTPATIENT
Start: 2018-07-18 | End: 2018-07-18

## 2018-07-18 RX ORDER — DILTIAZEM HCL 120 MG
1 CAPSULE, EXT RELEASE 24 HR ORAL
Qty: 90 | Refills: 0
Start: 2018-07-18 | End: 2018-08-16

## 2018-07-18 RX ORDER — AMIODARONE HYDROCHLORIDE 400 MG/1
200 TABLET ORAL
Qty: 0 | Refills: 0 | Status: DISCONTINUED | OUTPATIENT
Start: 2018-07-18 | End: 2018-07-18

## 2018-07-18 RX ADMIN — APIXABAN 5 MILLIGRAM(S): 2.5 TABLET, FILM COATED ORAL at 05:16

## 2018-07-18 RX ADMIN — Medication 25 MILLIGRAM(S): at 12:22

## 2018-07-18 RX ADMIN — Medication 5 MILLIGRAM(S): at 05:53

## 2018-07-18 RX ADMIN — AMIODARONE HYDROCHLORIDE 200 MILLIGRAM(S): 400 TABLET ORAL at 09:14

## 2018-07-18 RX ADMIN — APIXABAN 5 MILLIGRAM(S): 2.5 TABLET, FILM COATED ORAL at 17:59

## 2018-07-18 NOTE — PROGRESS NOTE ADULT - ASSESSMENT
Afib with rvr 2/2 noncompliance  -c/w pronestyl gtt, bb, and newly added amiodarone for conversion  -pt needs better lupe control  -c/w eliquis for a/c  HTN: c/w losartan (changed from valsartan), bb, can add back amlodipine if pt remains hypertensive  DVT PPx: eliquis

## 2018-07-18 NOTE — CONSULT NOTE ADULT - ASSESSMENT
Patient with history PAF. He early am got palpitations. He was in afib. Probably secondary lupe. Will try covert.

## 2018-07-18 NOTE — DISCHARGE NOTE ADULT - MEDICATION SUMMARY - MEDICATIONS TO CHANGE
I will SWITCH the dose or number of times a day I take the medications listed below when I get home from the hospital:    valsartan 160 mg oral capsule    Eliquis 5 mg oral tablet  -- 1 tab(s) by mouth 2 times a day   -- Check with your doctor before becoming pregnant.  It is very important that you take or use this exactly as directed.  Do not skip doses or discontinue unless directed by your doctor.  Obtain medical advice before taking any non-prescription drugs as some may affect the action of this medication.

## 2018-07-18 NOTE — CONSULT NOTE ADULT - SUBJECTIVE AND OBJECTIVE BOX
CARDIOLOGY CONSULT NOTE     CHIEF COMPLAINT/REASON FOR CONSULT:    HPI:  53 year old male dc'd from telemetry one week ago s/p an episode of atrial fibrillation, he had difficulty with taking the oral Cardizem and  was non compliant with his Eliquis until yesterday returns with palpitations and rapid afib. (17 Jul 2018 15:47)      PAST MEDICAL & SURGICAL HISTORY:  Atrial fibrillation: paroxysmal  Sleep apnea  Hypertension, unspecified type  History of appendectomy  Absence of gallbladder      Cardiac Risks:   [ x]HTN, [ ] DM, [ ] Smoking, [ ] FH,  [ ] Lipids        MEDICATIONS:  MEDICATIONS  (STANDING):  apixaban 5 milliGRAM(s) Oral every 12 hours  diltiazem    Tablet 60 milliGRAM(s) Oral three times a day  procainamide   Infusion 2 mG/Min (30 mL/Hr) IV Continuous <Continuous>  valsartan 160 milliGRAM(s) Oral daily      FAMILY HISTORY:  No pertinent family history in first degree relatives      SOCIAL HISTORY:      [ ] Marital status    Allergies    No Known Allergies        Percocet 5/325 (Other)  	    REVIEW OF SYSTEMS:  CONSTITUTIONAL: No fever, weight loss, or fatigue  EYES: No eye pain, visual disturbances, or discharge  ENMT:  No difficulty hearing, tinnitus, vertigo; No sinus or throat pain  NECK: No pain or stiffness  RESPIRATORY: No cough, wheezing, chills or hemoptysis; No Shortness of Breath  CARDIOVASCULAR: No chest pain, palpitations, passing out, dizziness, or leg swelling  GASTROINTESTINAL: No abdominal or epigastric pain. No nausea, vomiting, or hematemesis; No diarrhea or constipation. No melena or hematochezia.  GENITOURINARY: No dysuria, frequency, hematuria, or incontinence  NEUROLOGICAL: No headaches, memory loss, loss of strength, numbness, or tremors  SKIN: No itching, burning, rashes, or lesions   	      PHYSICAL EXAM:  T(C): 36.6 (07-17-18 @ 23:10), Max: 36.9 (07-17-18 @ 11:07)  HR: 130 (07-18-18 @ 05:17) (85 - 149)  BP: 144/97 (07-18-18 @ 05:17) (102/78 - 144/97)  RR: 18 (07-18-18 @ 07:10) (18 - 20)  SpO2: 97% (07-18-18 @ 05:17) (93% - 98%)  Wt(kg): --  I&O's Summary    17 Jul 2018 07:01  -  18 Jul 2018 07:00  --------------------------------------------------------  IN: 330 mL / OUT: 700 mL / NET: -370 mL        Appearance: Normal	  Psychiatry: A & O x 3, Mood & affect appropriate  HEENT:   Normal oral mucosa, PERRL, EOMI	  Lymphatic: No lymphadenopathy  Cardiovascular: Normal S1 S2,RRR, No JVD, No murmurs  Respiratory: Lungs clear to auscultation	  Gastrointestinal:  Soft, Non-tender, + BS	  Skin: No rashes, No ecchymoses, No cyanosis	  Neurologic: Non-focal  Extremities: Normal range of motion, No clubbing, cyanosis or edema  Vascular: Peripheral pulses palpable 2+ bilaterally      ECG:  	afib    	  LABS:	 	    CARDIAC MARKERS:                                    15.4   5.94  )-----------( 305      ( 17 Jul 2018 11:40 )             46.2     07-17    142  |  103  |  13  ----------------------------<  145<H>  4.7   |  27  |  0.9    Ca    9.5      17 Jul 2018 11:40    TPro  7.1  /  Alb  4.4  /  TBili  0.4  /  DBili  x   /  AST  17  /  ALT  37  /  AlkPhos  52  07-17    PT/INR - ( 17 Jul 2018 11:40 )   PT: 13.90 sec;   INR: 1.28 ratio         PTT - ( 17 Jul 2018 11:40 )  PTT:34.0 sec

## 2018-07-18 NOTE — DISCHARGE NOTE ADULT - PATIENT PORTAL LINK FT
You can access the [facility] Patient Portal, offered by [facility], by registering with the following website: [email or web address]

## 2018-07-18 NOTE — DISCHARGE NOTE ADULT - CARE PLAN
Principal Discharge DX:	Atrial fibrillation, unspecified type  Goal:	Converted to NSR on procainamide drip.  Assessment and plan of treatment:	F/u with  on Monday (7/23/18).

## 2018-07-18 NOTE — PROGRESS NOTE ADULT - SUBJECTIVE AND OBJECTIVE BOX
Patient remains in afib today      T(F): 97.9 (07-17-18 @ 23:10), Max: 98.3 (07-17-18 @ 17:30)  HR: 117 (07-18-18 @ 07:08)  BP: 116/72 (07-18-18 @ 07:08)  RR: 18 (07-18-18 @ 07:10)  SpO2: 96% (07-18-18 @ 07:08) (93% - 97%)    PHYSICAL EXAM:  GENERAL: NAD  HEAD:  Atraumatic, Normocephalic  NERVOUS SYSTEM:  Alert & Oriented X3, Good concentration; Motor Strength 5/5 B/L upper and lower extremities; DTRs 2+ intact and symmetric  CHEST/LUNG: Clear to percussion bilaterally; No rales, rhonchi, wheezing, or rubs  HEART: irregular rate and rhythm; No murmurs, rubs, or gallops  ABDOMEN: Soft, Nontender, Nondistended; Bowel sounds present  EXTREMITIES:  2+ Peripheral Pulses, No clubbing, cyanosis, or edema  LYMPH: No lymphadenopathy noted  SKIN: No rashes or lesions    LABS  07-17    142  |  103  |  13  ----------------------------<  145<H>  4.7   |  27  |  0.9    Ca    9.5      17 Jul 2018 11:40    TPro  7.1  /  Alb  4.4  /  TBili  0.4  /  DBili  x   /  AST  17  /  ALT  37  /  AlkPhos  52  07-17                          15.4   5.94  )-----------( 305      ( 17 Jul 2018 11:40 )             46.2     PT/INR - ( 17 Jul 2018 11:40 )   PT: 13.90 sec;   INR: 1.28 ratio         PTT - ( 17 Jul 2018 11:40 )  PTT:34.0 sec    CARDIAC ENZYMES  Creatine Kinase, Serum: 123 (07-17 @ 11:40)    CKMB Units: 1.4 (07-17 @ 11:40)    Troponin T, Serum: <0.01 ng/mL (07-17-18 @ 11:40)        RADIOLOGY  < from: Xray Chest 1 View-PORTABLE IMMEDIATE (07.17.18 @ 11:47) >  Impression:      No radiographic evidence of acute cardiopulmonary disease.    < end of copied text >    MEDICATIONS  (STANDING):  amiodarone    Tablet 200 milliGRAM(s) Oral two times a day  apixaban 5 milliGRAM(s) Oral every 12 hours  losartan 100 milliGRAM(s) Oral daily  metoprolol tartrate 25 milliGRAM(s) Oral four times a day  procainamide   Infusion 2 mG/Min (30 mL/Hr) IV Continuous <Continuous>    MEDICATIONS  (PRN):

## 2018-07-18 NOTE — CHART NOTE - NSCHARTNOTEFT_GEN_A_CORE
Discussed with  after the pt converted to NSR on Procainamide drip. He recommended to stop procainamide drip, stop amiodarone, observe the pt for one hour and discharge him on Cardizem.     Called  to inform him but he didn't answer the spectra. Discussed with  after the pt converted to NSR on Procainamide drip. He recommended to stop procainamide drip, stop amiodarone, observe the pt for one hour and discharge him on Cardizem.     Informed .

## 2018-07-18 NOTE — DISCHARGE NOTE ADULT - MEDICATION SUMMARY - MEDICATIONS TO TAKE
I will START or STAY ON the medications listed below when I get home from the hospital:    valsartan 160 mg oral capsule  -- Indication: For Hypertension, unspecified type    Cardizem 60 mg oral tablet  -- 1 tab(s) by mouth 3 times a day MDD:180mg  -- It is very important that you take or use this exactly as directed.  Do not skip doses or discontinue unless directed by your doctor.  Some non-prescription drugs may aggravate your condition.  Read all labels carefully.  If a warning appears, check with your doctor before taking.    -- Indication: For Atrial fibrillation    Eliquis 5 mg oral tablet  -- 1 tab(s) by mouth 2 times a day   -- Check with your doctor before becoming pregnant.  It is very important that you take or use this exactly as directed.  Do not skip doses or discontinue unless directed by your doctor.  Obtain medical advice before taking any non-prescription drugs as some may affect the action of this medication.    -- Indication: For Atrial fibrillation    amLODIPine 5 mg oral tablet  -- 1 tab(s) by mouth once a day   -- Indication: For Hypertension, unspecified type

## 2018-07-18 NOTE — PROGRESS NOTE ADULT - PROBLEM SELECTOR PLAN 1
now amio and lopressor as per cardiology  continue Eliquis  probably secondary to ESTEFANIA  telemetry and cardio follow up

## 2018-07-18 NOTE — DISCHARGE NOTE ADULT - HOSPITAL COURSE
54 yo M admitted with Afib with RVR. Converted to NSR on procainamide drip.   Discharging on ELiquis, cardizem, diovan.

## 2018-07-18 NOTE — PROGRESS NOTE ADULT - SUBJECTIVE AND OBJECTIVE BOX
52 y/o male with PMHx of HTN, ESTEFANIA on mouth piece device and paroxysmal afib p/w rapid heart rate. Similar episodes have happened in the past, likely triggered by uncontrolled estefania. Pt has failed multiple cpap masks in the past and currently uses a sleep device. He was discharged from the unit 1 week ago, for the same thing. His afib was converted with IV pronestyl at that time. He was rate controlled with cardizem cd and d/c with eliquis. He is now returning bc he was noncompliant with his medications and went back into afib. Now he is on procainamide drip and started on amio and bb.     Physical Exam:  General: morbidly obese gentleman, +ESTEFANIA  Cardiac: afib with rvr  Lungs: CTAB  Abd: NTND, +BS  LE: no swelling    Labs:  CARDIAC MARKERS ( 17 Jul 2018 11:40 )  x     / <0.01 ng/mL / 123 U/L / x     / 1.4 ng/mL                      15.4   5.94  )-----------( 305      ( 17 Jul 2018 11:40 )             46.2     07-17  142  |  103  |  13  ----------------------------<  145<H>  4.7   |  27  |  0.9  Ca    9.5      17 Jul 2018 11:40  TPro  7.1  /  Alb  4.4  /  TBili  0.4  /  DBili  x   /  AST  17  /  ALT  37  /  AlkPhos  52  07-17  LIVER FUNCTIONS - ( 17 Jul 2018 11:40 )  Alb: 4.4 g/dL / Pro: 7.1 g/dL / ALK PHOS: 52 U/L / ALT: 37 U/L / AST: 17 U/L / GGT: x         PT/INR - ( 17 Jul 2018 11:40 )   PT: 13.90 sec;   INR: 1.28 ratio    PTT - ( 17 Jul 2018 11:40 )  PTT:34.0 sec    I&O's Summary    17 Jul 2018 07:01  -  18 Jul 2018 07:00  --------------------------------------------------------  IN: 330 mL / OUT: 700 mL / NET: -370 mL    18 Jul 2018 07:01  -  18 Jul 2018 15:36  --------------------------------------------------------  IN: 0 mL / OUT: 250 mL / NET: -250 mL

## 2018-07-23 ENCOUNTER — APPOINTMENT (OUTPATIENT)
Dept: CARDIOLOGY | Facility: CLINIC | Age: 53
End: 2018-07-23

## 2018-07-23 VITALS
BODY MASS INDEX: 34 KG/M2 | WEIGHT: 251 LBS | DIASTOLIC BLOOD PRESSURE: 90 MMHG | HEART RATE: 70 BPM | HEIGHT: 72 IN | SYSTOLIC BLOOD PRESSURE: 142 MMHG

## 2018-07-25 DIAGNOSIS — Z91.19 PATIENT'S NONCOMPLIANCE WITH OTHER MEDICAL TREATMENT AND REGIMEN: ICD-10-CM

## 2018-07-25 DIAGNOSIS — I10 ESSENTIAL (PRIMARY) HYPERTENSION: ICD-10-CM

## 2018-07-25 DIAGNOSIS — I48.0 PAROXYSMAL ATRIAL FIBRILLATION: ICD-10-CM

## 2018-07-25 DIAGNOSIS — G47.33 OBSTRUCTIVE SLEEP APNEA (ADULT) (PEDIATRIC): ICD-10-CM

## 2018-07-25 DIAGNOSIS — R00.0 TACHYCARDIA, UNSPECIFIED: ICD-10-CM

## 2018-07-27 ENCOUNTER — APPOINTMENT (OUTPATIENT)
Dept: CARDIOLOGY | Facility: CLINIC | Age: 53
End: 2018-07-27

## 2018-07-27 VITALS — SYSTOLIC BLOOD PRESSURE: 110 MMHG | DIASTOLIC BLOOD PRESSURE: 70 MMHG | WEIGHT: 251 LBS | BODY MASS INDEX: 34.04 KG/M2

## 2018-08-28 ENCOUNTER — RECORD ABSTRACTING (OUTPATIENT)
Age: 53
End: 2018-08-28

## 2018-08-30 ENCOUNTER — OUTPATIENT (OUTPATIENT)
Dept: OUTPATIENT SERVICES | Facility: HOSPITAL | Age: 53
LOS: 1 days | Discharge: HOME | End: 2018-08-30

## 2018-08-30 DIAGNOSIS — Z98.890 OTHER SPECIFIED POSTPROCEDURAL STATES: Chronic | ICD-10-CM

## 2018-08-30 DIAGNOSIS — R91.8 OTHER NONSPECIFIC ABNORMAL FINDING OF LUNG FIELD: ICD-10-CM

## 2018-08-30 DIAGNOSIS — Z90.49 ACQUIRED ABSENCE OF OTHER SPECIFIED PARTS OF DIGESTIVE TRACT: Chronic | ICD-10-CM

## 2018-09-12 ENCOUNTER — OUTPATIENT (OUTPATIENT)
Dept: OUTPATIENT SERVICES | Facility: HOSPITAL | Age: 53
LOS: 1 days | Discharge: HOME | End: 2018-09-12

## 2018-09-12 DIAGNOSIS — Z98.890 OTHER SPECIFIED POSTPROCEDURAL STATES: Chronic | ICD-10-CM

## 2018-09-12 DIAGNOSIS — Z90.49 ACQUIRED ABSENCE OF OTHER SPECIFIED PARTS OF DIGESTIVE TRACT: Chronic | ICD-10-CM

## 2018-09-13 DIAGNOSIS — G47.33 OBSTRUCTIVE SLEEP APNEA (ADULT) (PEDIATRIC): ICD-10-CM

## 2018-10-03 ENCOUNTER — APPOINTMENT (OUTPATIENT)
Dept: CARDIOLOGY | Facility: CLINIC | Age: 53
End: 2018-10-03

## 2018-10-03 VITALS
OXYGEN SATURATION: 97 % | HEART RATE: 75 BPM | HEIGHT: 72 IN | WEIGHT: 251 LBS | DIASTOLIC BLOOD PRESSURE: 90 MMHG | SYSTOLIC BLOOD PRESSURE: 133 MMHG | BODY MASS INDEX: 34 KG/M2

## 2018-10-05 ENCOUNTER — APPOINTMENT (OUTPATIENT)
Dept: CARDIOLOGY | Facility: CLINIC | Age: 53
End: 2018-10-05

## 2018-10-05 VITALS
DIASTOLIC BLOOD PRESSURE: 87 MMHG | HEIGHT: 72 IN | OXYGEN SATURATION: 98 % | HEART RATE: 70 BPM | WEIGHT: 251 LBS | BODY MASS INDEX: 34 KG/M2 | SYSTOLIC BLOOD PRESSURE: 135 MMHG

## 2018-10-24 ENCOUNTER — APPOINTMENT (OUTPATIENT)
Dept: CARDIOLOGY | Facility: CLINIC | Age: 53
End: 2018-10-24

## 2018-12-07 ENCOUNTER — APPOINTMENT (OUTPATIENT)
Dept: CARDIOLOGY | Facility: CLINIC | Age: 53
End: 2018-12-07

## 2018-12-11 NOTE — ED ADULT NURSE NOTE - IN THE PAST 12 MONTHS HAVE YOU USED DRUGS OTHER THAN THOSE REQUIRED FOR MEDICAL REASON?
PRE-SEDATION ASSESSMENT    CONSENT  Consent for procedure and sedation obtained: Yes    MEDICAL HISTORY  Significant medical/surgical history: Yes  Past Complications with Sedation/Anesthesia: No  Significant Family History: No  Smoking History: No  Alcohol/Drug abuse: No  Possible Pregnancy (LMP): No  Cardiac History: No  Respiratory History: No    PHYSICAL EXAM  History and Physical Reviewed: H&P completed today  Airway Risk History: No previous complications  Airway Anatomy : Class II  Heart : Normal  Lungs : Normal  LOC/Mental Status : Normal    OTHER FINDINGS  Reviewed current medications and allergies: Yes  Pertinent lab/diagnostic test reviewed: Yes    SEDATION RISK ASSESSMENT  Risk Status ASA: Class II - Normal patient with mild systemic disease  Plan for Sedation: Moderate Sedation  Indications for Procedure/Pre-Procedure Diagnosis and Planned Procedure: Screening  EKG Monitoring: Yes    NARRATIVE FINDINGS     
No

## 2019-01-14 ENCOUNTER — APPOINTMENT (OUTPATIENT)
Dept: CARDIOLOGY | Facility: CLINIC | Age: 54
End: 2019-01-14

## 2019-01-14 VITALS
SYSTOLIC BLOOD PRESSURE: 122 MMHG | DIASTOLIC BLOOD PRESSURE: 84 MMHG | HEART RATE: 82 BPM | HEIGHT: 72 IN | WEIGHT: 243 LBS | BODY MASS INDEX: 32.91 KG/M2

## 2019-01-14 DIAGNOSIS — Z80.1 FAMILY HISTORY OF MALIGNANT NEOPLASM OF TRACHEA, BRONCHUS AND LUNG: ICD-10-CM

## 2019-01-14 NOTE — HISTORY OF PRESENT ILLNESS
[FreeTextEntry1] : 54 y/o male with PAF, ESTEFANIA, presents for f/u. No chest pain. had several bouts of AF, despite flecainide. Had seen Dr. Blake, however his ablation was cancelled, due to his mother's sickness and hospitalization (she passed away this week). He was unable to take Lovenox, Eliquis or Xarelto due to muscle spasms. Will try Pradaxa, as he would need AC if he goes for ablation.

## 2019-01-14 NOTE — REASON FOR VISIT
[Follow-Up - Clinic] : a clinic follow-up of [Atrial Fibrillation] : atrial fibrillation [Hypertension] : hypertension [Mitral Regurgitation] : mitral regurgitation

## 2019-01-14 NOTE — ASSESSMENT
[FreeTextEntry1] : Recurrent AF with RVR - converted to Sinus.\par BP is controlled\par Off Xarelto, on ASA.\par ESTEFANIA - using dental device - had sleep study - much better with the device\par BP control\par CHADS-VASC of 1.\par Echo reviewed.\par Prior w/u: stress test - negative. 2D echo - normal LV function.\par CT chest - no coronary calcium\par Increase flecainide to 150 mg q12.\par Start Pradaxa 150 mg q12\par F/u with Dr. Blake. If continues to have symptoms, next step should be ablation.\par

## 2019-01-14 NOTE — PHYSICAL EXAM
[General Appearance - Well Developed] : well developed [Normal Appearance] : normal appearance [Well Groomed] : well groomed [General Appearance - Well Nourished] : well nourished [No Deformities] : no deformities [General Appearance - In No Acute Distress] : no acute distress [Normal Conjunctiva] : the conjunctiva exhibited no abnormalities [Normal Oral Mucosa] : normal oral mucosa [Normal Oropharynx] : normal oropharynx [Respiration, Rhythm And Depth] : normal respiratory rhythm and effort [Exaggerated Use Of Accessory Muscles For Inspiration] : no accessory muscle use [Auscultation Breath Sounds / Voice Sounds] : lungs were clear to auscultation bilaterally [Heart Rate And Rhythm] : heart rate and rhythm were normal [Heart Sounds] : normal S1 and S2 [Murmurs] : no murmurs present [Abdomen Soft] : soft [Abdomen Tenderness] : non-tender [] : no hepato-splenomegaly [Abdomen Mass (___ Cm)] : no abdominal mass palpated [Abnormal Walk] : normal gait [Nail Clubbing] : no clubbing of the fingernails [Cyanosis, Localized] : no localized cyanosis [FreeTextEntry1] : no pedal edema [Skin Color & Pigmentation] : normal skin color and pigmentation [Oriented To Time, Place, And Person] : oriented to person, place, and time [Affect] : the affect was normal [Mood] : the mood was normal [No Anxiety] : not feeling anxious

## 2019-02-16 NOTE — ASSESSMENT
[FreeTextEntry1] : Recurrent AF with RVR - currently in NSR\par - discussed with patient differetn options which include ablation or continue AAM. I have explained risk an benefits of each of the options\par - Pt want ot think about it\par - Pt has VHADVASC 1; discussed th eirsk if CVA with AF; If pt proceed with ablation will start AC prior to procedure\par

## 2019-02-16 NOTE — PHYSICAL EXAM
[General Appearance - Well Developed] : well developed [Normal Appearance] : normal appearance [Well Groomed] : well groomed [General Appearance - Well Nourished] : well nourished [No Deformities] : no deformities [General Appearance - In No Acute Distress] : no acute distress [Normal Conjunctiva] : the conjunctiva exhibited no abnormalities [Normal Oral Mucosa] : normal oral mucosa [Normal Oropharynx] : normal oropharynx [Heart Rate And Rhythm] : heart rate and rhythm were normal [Heart Sounds] : normal S1 and S2 [Murmurs] : no murmurs present [Respiration, Rhythm And Depth] : normal respiratory rhythm and effort [Exaggerated Use Of Accessory Muscles For Inspiration] : no accessory muscle use [Auscultation Breath Sounds / Voice Sounds] : lungs were clear to auscultation bilaterally [Abdomen Soft] : soft [Abdomen Tenderness] : non-tender [] : no hepato-splenomegaly [Abdomen Mass (___ Cm)] : no abdominal mass palpated [Abnormal Walk] : normal gait [Nail Clubbing] : no clubbing of the fingernails [Cyanosis, Localized] : no localized cyanosis [FreeTextEntry1] : no pedal edema [Skin Color & Pigmentation] : normal skin color and pigmentation [Oriented To Time, Place, And Person] : oriented to person, place, and time [Affect] : the affect was normal [Mood] : the mood was normal [No Anxiety] : not feeling anxious

## 2019-02-16 NOTE — HISTORY OF PRESENT ILLNESS
[FreeTextEntry1] : The patient has a history of mild mitral insufficiency, hypertension and hypercholesterolemia PAF symptotic using flecanined dx 3 years a go. CHADVASC 1 Pt had back pain on eliqus and xarlto and lovenox. \par \par Patient c/o palpitations and some SOB. Came to the office to discuss different options for treatment of his AF\par \par EKG SR 74 bpm

## 2019-02-21 ENCOUNTER — APPOINTMENT (OUTPATIENT)
Dept: CARDIOLOGY | Facility: CLINIC | Age: 54
End: 2019-02-21

## 2019-02-21 VITALS — SYSTOLIC BLOOD PRESSURE: 111 MMHG | WEIGHT: 250 LBS | BODY MASS INDEX: 33.91 KG/M2 | DIASTOLIC BLOOD PRESSURE: 70 MMHG

## 2019-02-21 NOTE — ASSESSMENT
[FreeTextEntry1] : This is a 51-year-old patient, the patient Dr. Lawson, who desires to continue his medical care since Dr. Lawson retired. The patient denies chest pain, SOB, presyncope, palpitations, dizziness or syncope. He is complaint with his cardiac medications. \par \par PAF - now in NSR.\par CHADS-VASC of 1.\par \par AF ablation\par \par I have agasin discussed different treatment options with IRMA BETHEA including anti-arrhythmic medications or ablation.  After a long discussion, the patient would like to proceed with an ablation.  I have explained the risks and benefits of the procedure to the patient.  There is approximately 1-2% chance of any major cardiovascular complication to occur. Complications include, but are not limited to infection, bleeding, damage to the vessels, hole in the heart, stroke, death and heart attack. There is also 1% risk of phrenic nerve injury.  The patient understands the risk and would like to proceed with the procedure. Patient had opportunity to ask questions. Patient indicated that all of his questions were answered to his satisfaction and verbalized understanding.\par \par \par

## 2019-02-21 NOTE — ADDENDUM
[FreeTextEntry1] : This note was documented by Alexandre Tinoco on 1/04/17 acting as scribe for Mundo Barnett M.D..\par \par I, Mundo Barnett M.D, certify that all of the documentation, medical decision making, and assessment within this note are true and correct.

## 2019-03-05 ENCOUNTER — MEDICATION RENEWAL (OUTPATIENT)
Age: 54
End: 2019-03-05

## 2019-03-11 ENCOUNTER — APPOINTMENT (OUTPATIENT)
Dept: CARDIOLOGY | Facility: CLINIC | Age: 54
End: 2019-03-11

## 2019-03-11 ENCOUNTER — CHART COPY (OUTPATIENT)
Age: 54
End: 2019-03-11

## 2019-03-11 VITALS
HEART RATE: 67 BPM | SYSTOLIC BLOOD PRESSURE: 128 MMHG | DIASTOLIC BLOOD PRESSURE: 86 MMHG | BODY MASS INDEX: 33.46 KG/M2 | WEIGHT: 247 LBS | HEIGHT: 72 IN

## 2019-03-11 NOTE — HISTORY OF PRESENT ILLNESS
[FreeTextEntry1] : 55 y/o male with PAF, ESTEFANIA, presents for f/u. No chest pain. On flecainide - only one episode of AF since the last visit. Had seen Dr. Blake, ablation was scheduled for this month, however he was now diagnosed with a kidney stone, so he will need to see urology. Off AC. No chest pain. + right flank pain.

## 2019-03-11 NOTE — ASSESSMENT
[FreeTextEntry1] : Recurrent AF with RVR - converted to Sinus. Reasonable control on flecainide BID.\par BP is controlled\par Off Xarelto, on ASA.\par ESTEFANIA - using dental device - had sleep study - much better with the device\par BP control\par CHADS-VASC of 1.\par Echo reviewed.\par Prior w/u: stress test - negative. 2D echo - normal LV function.\par CT chest - no coronary calcium\par C/w flecainide to 150 mg q12.\par Urology evaluation. He previously saw Dr. Smart, but has to switch due to insurance coverage and is in the process of getting a different urologist that accepts his plan.\par F/u with Dr. Blake. Will hold off on ablation for now until the kidney situation has resolved, given implications for anticoagulation, also since his rhythm is fairly well controlled.

## 2019-06-17 ENCOUNTER — APPOINTMENT (OUTPATIENT)
Dept: CARDIOLOGY | Facility: CLINIC | Age: 54
End: 2019-06-17
Payer: COMMERCIAL

## 2019-06-17 VITALS
HEIGHT: 72 IN | HEART RATE: 81 BPM | SYSTOLIC BLOOD PRESSURE: 134 MMHG | DIASTOLIC BLOOD PRESSURE: 94 MMHG | WEIGHT: 245 LBS | BODY MASS INDEX: 33.18 KG/M2

## 2019-06-17 PROCEDURE — 99213 OFFICE O/P EST LOW 20 MIN: CPT

## 2019-06-17 PROCEDURE — 93000 ELECTROCARDIOGRAM COMPLETE: CPT

## 2019-06-17 NOTE — PHYSICAL EXAM
[General Appearance - Well Developed] : well developed [Well Groomed] : well groomed [Normal Appearance] : normal appearance [General Appearance - Well Nourished] : well nourished [No Deformities] : no deformities [General Appearance - In No Acute Distress] : no acute distress [Normal Oral Mucosa] : normal oral mucosa [Normal Conjunctiva] : the conjunctiva exhibited no abnormalities [Normal Oropharynx] : normal oropharynx [Exaggerated Use Of Accessory Muscles For Inspiration] : no accessory muscle use [Respiration, Rhythm And Depth] : normal respiratory rhythm and effort [Auscultation Breath Sounds / Voice Sounds] : lungs were clear to auscultation bilaterally [Heart Rate And Rhythm] : heart rate and rhythm were normal [Murmurs] : no murmurs present [Heart Sounds] : normal S1 and S2 [] : no hepato-splenomegaly [Abdomen Soft] : soft [Abdomen Tenderness] : non-tender [Abnormal Walk] : normal gait [Nail Clubbing] : no clubbing of the fingernails [Abdomen Mass (___ Cm)] : no abdominal mass palpated [Skin Color & Pigmentation] : normal skin color and pigmentation [Cyanosis, Localized] : no localized cyanosis [FreeTextEntry1] : no pedal edema [Mood] : the mood was normal [Oriented To Time, Place, And Person] : oriented to person, place, and time [Affect] : the affect was normal [No Anxiety] : not feeling anxious

## 2019-06-17 NOTE — ASSESSMENT
[FreeTextEntry1] : Recurrent AF with RVR - converted to Sinus. Reasonable control on flecainide BID.\par BP is controlled\par Off Xarelto, on ASA.\par ESTEFANIA - using dental device - had sleep study - much better with the device\par BP control\par CHADS-VASC of 1.\par Echo reviewed.\par Prior w/u: stress test - negative. 2D echo - normal LV function.\par CT chest - no coronary calcium\par C/w flecainide to 150 mg q12.\par \par F/u with Dr. Blake.

## 2019-06-17 NOTE — HISTORY OF PRESENT ILLNESS
[FreeTextEntry1] : 55 y/o male with PAF, ESTEFANIA, presents for f/u. No chest pain. On flecainide - only one episode of AF since the last visit. Had seen Dr. Blake, ablation was re-scheduled due to a kidney stone, but was found to have no stone on repeat CT. Now seeing iza

## 2019-06-17 NOTE — REASON FOR VISIT
[Follow-Up - Clinic] : a clinic follow-up of [Hypertension] : hypertension [Atrial Fibrillation] : atrial fibrillation [Mitral Regurgitation] : mitral regurgitation

## 2019-07-03 ENCOUNTER — MEDICATION RENEWAL (OUTPATIENT)
Age: 54
End: 2019-07-03

## 2019-09-24 ENCOUNTER — APPOINTMENT (OUTPATIENT)
Dept: CARDIOLOGY | Facility: CLINIC | Age: 54
End: 2019-09-24
Payer: COMMERCIAL

## 2019-09-24 VITALS
HEIGHT: 72 IN | HEART RATE: 63 BPM | BODY MASS INDEX: 33.32 KG/M2 | DIASTOLIC BLOOD PRESSURE: 90 MMHG | SYSTOLIC BLOOD PRESSURE: 134 MMHG | WEIGHT: 246 LBS

## 2019-09-24 PROCEDURE — 93000 ELECTROCARDIOGRAM COMPLETE: CPT

## 2019-09-24 PROCEDURE — 99214 OFFICE O/P EST MOD 30 MIN: CPT

## 2019-09-24 NOTE — PHYSICAL EXAM
[General Appearance - Well Developed] : well developed [Normal Appearance] : normal appearance [Well Groomed] : well groomed [General Appearance - Well Nourished] : well nourished [No Deformities] : no deformities [General Appearance - In No Acute Distress] : no acute distress [Normal Conjunctiva] : the conjunctiva exhibited no abnormalities [Normal Oral Mucosa] : normal oral mucosa [Normal Oropharynx] : normal oropharynx [Respiration, Rhythm And Depth] : normal respiratory rhythm and effort [Exaggerated Use Of Accessory Muscles For Inspiration] : no accessory muscle use [Auscultation Breath Sounds / Voice Sounds] : lungs were clear to auscultation bilaterally [Heart Rate And Rhythm] : heart rate and rhythm were normal [Heart Sounds] : normal S1 and S2 [Murmurs] : no murmurs present [Abdomen Soft] : soft [Abdomen Tenderness] : non-tender [] : no hepato-splenomegaly [Abdomen Mass (___ Cm)] : no abdominal mass palpated [Abnormal Walk] : normal gait [Nail Clubbing] : no clubbing of the fingernails [FreeTextEntry1] : no pedal edema [Cyanosis, Localized] : no localized cyanosis [Skin Color & Pigmentation] : normal skin color and pigmentation [Oriented To Time, Place, And Person] : oriented to person, place, and time [Affect] : the affect was normal [Mood] : the mood was normal [No Anxiety] : not feeling anxious

## 2019-09-24 NOTE — ASSESSMENT
[FreeTextEntry1] : Recurrent AF with RVR - converted to Sinus. Reasonable control on flecainide BID.\par BP is controlled\par Off Xarelto, on ASA.\par ESTEFANIA - using dental device - had sleep study - much better with the device\par BP control\par CHADS-VASC of 1.\par Echo reviewed.\par Prior w/u: stress test - negative. 2D echo - normal LV function.\par CT chest - no coronary calcium\par C/w flecainide to 150 mg q12.\par Risks and benefits of ablation versus continued medical therapy discussed.\par F/u with Dr. Blake to discuss ablation.

## 2019-09-24 NOTE — HISTORY OF PRESENT ILLNESS
[FreeTextEntry1] : 55 y/o male with PAF, ESTEFANIA, presents for f/u. No chest pain. On flecainide - only one episode of AF since the last visit. Had seen Dr. Blake, ablation was re-scheduled due to a kidney stone, but now on hold. He reports episodes of fatigue. No syncope. Occasional AF episodes about once a month. No bleeding. Previously could not tolerate Xarelto or Eliquis, but tolerated Pradaxa.

## 2019-11-15 ENCOUNTER — APPOINTMENT (OUTPATIENT)
Dept: CARDIOLOGY | Facility: CLINIC | Age: 54
End: 2019-11-15
Payer: COMMERCIAL

## 2019-11-15 VITALS
HEIGHT: 72 IN | HEART RATE: 66 BPM | WEIGHT: 245 LBS | SYSTOLIC BLOOD PRESSURE: 124 MMHG | DIASTOLIC BLOOD PRESSURE: 81 MMHG | BODY MASS INDEX: 33.18 KG/M2

## 2019-11-15 PROCEDURE — 93000 ELECTROCARDIOGRAM COMPLETE: CPT

## 2019-11-15 PROCEDURE — 99215 OFFICE O/P EST HI 40 MIN: CPT

## 2019-11-15 NOTE — HISTORY OF PRESENT ILLNESS
[FreeTextEntry1] : \par The patient has a history of mild mitral insufficiency, hypertension and hypercholesterolemia PAF sympotatic using flecanined  dx 3 yearsa a go. CHADVASC 1 Pt had back pain on eliqus and xarlto and lovenox. \par \par Pt had fecanied helped with symptom but still has episode Since October 7 episodesbut symptomatic c\par \par EKG SR 66 bpm

## 2019-11-15 NOTE — PHYSICAL EXAM
[General Appearance - Well Developed] : well developed [Normal Appearance] : normal appearance [Well Groomed] : well groomed [General Appearance - In No Acute Distress] : no acute distress [General Appearance - Well Nourished] : well nourished [No Deformities] : no deformities [Normal Conjunctiva] : the conjunctiva exhibited no abnormalities [Normal Oral Mucosa] : normal oral mucosa [Normal Oropharynx] : normal oropharynx [Respiration, Rhythm And Depth] : normal respiratory rhythm and effort [Exaggerated Use Of Accessory Muscles For Inspiration] : no accessory muscle use [Auscultation Breath Sounds / Voice Sounds] : lungs were clear to auscultation bilaterally [Heart Rate And Rhythm] : heart rate and rhythm were normal [Heart Sounds] : normal S1 and S2 [Murmurs] : no murmurs present [Abdomen Soft] : soft [Abdomen Tenderness] : non-tender [Abdomen Mass (___ Cm)] : no abdominal mass palpated [] : no hepato-splenomegaly [Abnormal Walk] : normal gait [Nail Clubbing] : no clubbing of the fingernails [FreeTextEntry1] : no pedal edema [Cyanosis, Localized] : no localized cyanosis [Skin Color & Pigmentation] : normal skin color and pigmentation [Oriented To Time, Place, And Person] : oriented to person, place, and time [Mood] : the mood was normal [Affect] : the affect was normal [No Anxiety] : not feeling anxious

## 2019-11-15 NOTE — ASSESSMENT
[FreeTextEntry1] : This is a 54-year-old patient, the patient Dr. Lawson, who desires to continue his medical care since Dr. Lawson retired. The patient denies chest pain, SOB, presyncope, palpitations, dizziness or syncope. He is complaint with his cardiac medications. \par \par Pt has no back while on pradaxa.\par \par \par \par PAF - now in NSR.\par CHADS-VASC of 1.\par \par AF ablation\par \par I have agasin discussed different treatment options with IRMA BETHEA including anti-arrhythmic medications or ablation.  After a long discussion, the patient would like to proceed with an ablation.  I have explained the risks and benefits of the procedure to the patient.  There is approximately 1-2% chance of any major cardiovascular complication to occur. Complications include, but are not limited to infection, bleeding, damage to the vessels, hole in the heart, stroke, death and heart attack. There is also 1% risk of phrenic nerve injury.  The patient understands the risk and would like to proceed with the procedure. Patient had opportunity to ask questions. Patient indicated that all of his questions were answered to his satisfaction and verbalized understanding.\par \par \par

## 2019-12-20 ENCOUNTER — OUTPATIENT (OUTPATIENT)
Dept: OUTPATIENT SERVICES | Facility: HOSPITAL | Age: 54
LOS: 1 days | Discharge: HOME | End: 2019-12-20
Payer: COMMERCIAL

## 2019-12-20 VITALS
OXYGEN SATURATION: 100 % | TEMPERATURE: 98 F | DIASTOLIC BLOOD PRESSURE: 74 MMHG | HEIGHT: 72 IN | HEART RATE: 70 BPM | WEIGHT: 246.92 LBS | SYSTOLIC BLOOD PRESSURE: 138 MMHG | RESPIRATION RATE: 16 BRPM

## 2019-12-20 DIAGNOSIS — Z90.49 ACQUIRED ABSENCE OF OTHER SPECIFIED PARTS OF DIGESTIVE TRACT: Chronic | ICD-10-CM

## 2019-12-20 DIAGNOSIS — Z98.890 OTHER SPECIFIED POSTPROCEDURAL STATES: Chronic | ICD-10-CM

## 2019-12-20 DIAGNOSIS — Z01.818 ENCOUNTER FOR OTHER PREPROCEDURAL EXAMINATION: ICD-10-CM

## 2019-12-20 DIAGNOSIS — I48.91 UNSPECIFIED ATRIAL FIBRILLATION: ICD-10-CM

## 2019-12-20 DIAGNOSIS — R05 COUGH: ICD-10-CM

## 2019-12-20 LAB
ALBUMIN SERPL ELPH-MCNC: 4.5 G/DL — SIGNIFICANT CHANGE UP (ref 3.5–5.2)
ALP SERPL-CCNC: 50 U/L — SIGNIFICANT CHANGE UP (ref 30–115)
ALT FLD-CCNC: 33 U/L — SIGNIFICANT CHANGE UP (ref 0–41)
ANION GAP SERPL CALC-SCNC: 19 MMOL/L — HIGH (ref 7–14)
APTT BLD: 35.5 SEC — SIGNIFICANT CHANGE UP (ref 27–39.2)
AST SERPL-CCNC: 21 U/L — SIGNIFICANT CHANGE UP (ref 0–41)
BASOPHILS # BLD AUTO: 0.02 K/UL — SIGNIFICANT CHANGE UP (ref 0–0.2)
BASOPHILS NFR BLD AUTO: 0.3 % — SIGNIFICANT CHANGE UP (ref 0–1)
BILIRUB SERPL-MCNC: 0.4 MG/DL — SIGNIFICANT CHANGE UP (ref 0.2–1.2)
BUN SERPL-MCNC: 14 MG/DL — SIGNIFICANT CHANGE UP (ref 10–20)
CALCIUM SERPL-MCNC: 9 MG/DL — SIGNIFICANT CHANGE UP (ref 8.5–10.1)
CHLORIDE SERPL-SCNC: 98 MMOL/L — SIGNIFICANT CHANGE UP (ref 98–110)
CO2 SERPL-SCNC: 25 MMOL/L — SIGNIFICANT CHANGE UP (ref 17–32)
CREAT SERPL-MCNC: 0.8 MG/DL — SIGNIFICANT CHANGE UP (ref 0.7–1.5)
EOSINOPHIL # BLD AUTO: 0.1 K/UL — SIGNIFICANT CHANGE UP (ref 0–0.7)
EOSINOPHIL NFR BLD AUTO: 1.5 % — SIGNIFICANT CHANGE UP (ref 0–8)
GLUCOSE SERPL-MCNC: 75 MG/DL — SIGNIFICANT CHANGE UP (ref 70–99)
HCT VFR BLD CALC: 46.2 % — SIGNIFICANT CHANGE UP (ref 42–52)
HGB BLD-MCNC: 15.5 G/DL — SIGNIFICANT CHANGE UP (ref 14–18)
IMM GRANULOCYTES NFR BLD AUTO: 0.4 % — HIGH (ref 0.1–0.3)
INR BLD: 1.04 RATIO — SIGNIFICANT CHANGE UP (ref 0.65–1.3)
LYMPHOCYTES # BLD AUTO: 1.68 K/UL — SIGNIFICANT CHANGE UP (ref 1.2–3.4)
LYMPHOCYTES # BLD AUTO: 25 % — SIGNIFICANT CHANGE UP (ref 20.5–51.1)
MCHC RBC-ENTMCNC: 31.4 PG — HIGH (ref 27–31)
MCHC RBC-ENTMCNC: 33.5 G/DL — SIGNIFICANT CHANGE UP (ref 32–37)
MCV RBC AUTO: 93.5 FL — SIGNIFICANT CHANGE UP (ref 80–94)
MONOCYTES # BLD AUTO: 0.71 K/UL — HIGH (ref 0.1–0.6)
MONOCYTES NFR BLD AUTO: 10.6 % — HIGH (ref 1.7–9.3)
NEUTROPHILS # BLD AUTO: 4.18 K/UL — SIGNIFICANT CHANGE UP (ref 1.4–6.5)
NEUTROPHILS NFR BLD AUTO: 62.2 % — SIGNIFICANT CHANGE UP (ref 42.2–75.2)
NRBC # BLD: 0 /100 WBCS — SIGNIFICANT CHANGE UP (ref 0–0)
PLATELET # BLD AUTO: 337 K/UL — SIGNIFICANT CHANGE UP (ref 130–400)
POTASSIUM SERPL-MCNC: 4.3 MMOL/L — SIGNIFICANT CHANGE UP (ref 3.5–5)
POTASSIUM SERPL-SCNC: 4.3 MMOL/L — SIGNIFICANT CHANGE UP (ref 3.5–5)
PROT SERPL-MCNC: 7.3 G/DL — SIGNIFICANT CHANGE UP (ref 6–8)
PROTHROM AB SERPL-ACNC: 11.9 SEC — SIGNIFICANT CHANGE UP (ref 9.95–12.87)
RBC # BLD: 4.94 M/UL — SIGNIFICANT CHANGE UP (ref 4.7–6.1)
RBC # FLD: 12.5 % — SIGNIFICANT CHANGE UP (ref 11.5–14.5)
SODIUM SERPL-SCNC: 142 MMOL/L — SIGNIFICANT CHANGE UP (ref 135–146)
WBC # BLD: 6.72 K/UL — SIGNIFICANT CHANGE UP (ref 4.8–10.8)
WBC # FLD AUTO: 6.72 K/UL — SIGNIFICANT CHANGE UP (ref 4.8–10.8)

## 2019-12-20 PROCEDURE — 71250 CT THORAX DX C-: CPT | Mod: 26

## 2019-12-20 PROCEDURE — 93010 ELECTROCARDIOGRAM REPORT: CPT

## 2019-12-20 RX ORDER — CANDESARTAN CILEXETIL 8 MG/1
1 TABLET ORAL
Qty: 0 | Refills: 0 | DISCHARGE

## 2019-12-20 RX ORDER — VALSARTAN 80 MG/1
0 TABLET ORAL
Qty: 0 | Refills: 0 | DISCHARGE

## 2019-12-20 NOTE — H&P PST ADULT - REASON FOR ADMISSION
54 yr old man to past for ep study afib mapping ablation cryo rashad no fever no uri uti cp palp sob pain at this time exercise tolerance is 2 flights lupe screen revd 54 yr old man to past for ep study afib mapping ablation cryo rashad h/o paf 1 yr on asa and flecanaide  no fever no uri uti cp palp sob pain at this time exercise tolerance is 2 flights lupe screen revd + dx uses oral appliance no machine

## 2019-12-20 NOTE — H&P PST ADULT - NSICDXPASTMEDICALHX_GEN_ALL_CORE_FT
PAST MEDICAL HISTORY:  Atrial fibrillation paroxysmal    Dyslipidemia     Hypertension, unspecified type     Mitral regurgitation     Sleep apnea PAST MEDICAL HISTORY:  Atrial fibrillation paroxysmal x 1yr    Hypertension, unspecified type     Mitral regurgitation     Sleep apnea no cpap uses oral mouth appliance

## 2019-12-20 NOTE — H&P PST ADULT - NSICDXPASTSURGICALHX_GEN_ALL_CORE_FT
PAST SURGICAL HISTORY:  Absence of gallbladder     History of appendectomy PAST SURGICAL HISTORY:  Absence of gallbladder 2013    History of appendectomy 1987

## 2019-12-30 PROBLEM — I48.91 UNSPECIFIED ATRIAL FIBRILLATION: Chronic | Status: ACTIVE | Noted: 2018-02-06

## 2019-12-30 PROBLEM — I34.0 NONRHEUMATIC MITRAL (VALVE) INSUFFICIENCY: Chronic | Status: ACTIVE | Noted: 2019-12-20

## 2019-12-30 PROBLEM — G47.30 SLEEP APNEA, UNSPECIFIED: Chronic | Status: ACTIVE | Noted: 2018-02-06

## 2020-01-06 ENCOUNTER — INPATIENT (INPATIENT)
Facility: HOSPITAL | Age: 55
LOS: 0 days | Discharge: HOME | End: 2020-01-07
Attending: INTERNAL MEDICINE | Admitting: INTERNAL MEDICINE
Payer: COMMERCIAL

## 2020-01-06 VITALS — WEIGHT: 231.49 LBS

## 2020-01-06 DIAGNOSIS — Z98.890 OTHER SPECIFIED POSTPROCEDURAL STATES: Chronic | ICD-10-CM

## 2020-01-06 DIAGNOSIS — Z90.49 ACQUIRED ABSENCE OF OTHER SPECIFIED PARTS OF DIGESTIVE TRACT: Chronic | ICD-10-CM

## 2020-01-06 DIAGNOSIS — I48.91 UNSPECIFIED ATRIAL FIBRILLATION: ICD-10-CM

## 2020-01-06 PROCEDURE — 93312 ECHO TRANSESOPHAGEAL: CPT | Mod: 26

## 2020-01-06 PROCEDURE — 93320 DOPPLER ECHO COMPLETE: CPT | Mod: 26

## 2020-01-06 PROCEDURE — 93623 PRGRMD STIMJ&PACG IV RX NFS: CPT | Mod: 26

## 2020-01-06 PROCEDURE — 93318 ECHO TRANSESOPHAGEAL INTRAOP: CPT | Mod: 26,59

## 2020-01-06 PROCEDURE — 93662 INTRACARDIAC ECG (ICE): CPT | Mod: 26

## 2020-01-06 PROCEDURE — 93656 COMPRE EP EVAL ABLTJ ATR FIB: CPT

## 2020-01-06 PROCEDURE — 93613 INTRACARDIAC EPHYS 3D MAPG: CPT

## 2020-01-06 PROCEDURE — 93325 DOPPLER ECHO COLOR FLOW MAPG: CPT | Mod: 26

## 2020-01-06 RX ORDER — FLECAINIDE ACETATE 50 MG
150 TABLET ORAL
Refills: 0 | Status: DISCONTINUED | OUTPATIENT
Start: 2020-01-06 | End: 2020-01-07

## 2020-01-06 RX ORDER — MORPHINE SULFATE 50 MG/1
2 CAPSULE, EXTENDED RELEASE ORAL ONCE
Refills: 0 | Status: DISCONTINUED | OUTPATIENT
Start: 2020-01-06 | End: 2020-01-06

## 2020-01-06 RX ORDER — INFLUENZA VIRUS VACCINE 15; 15; 15; 15 UG/.5ML; UG/.5ML; UG/.5ML; UG/.5ML
0.5 SUSPENSION INTRAMUSCULAR ONCE
Refills: 0 | Status: COMPLETED | OUTPATIENT
Start: 2020-01-06 | End: 2020-01-06

## 2020-01-06 RX ORDER — AMLODIPINE BESYLATE 2.5 MG/1
5 TABLET ORAL DAILY
Refills: 0 | Status: DISCONTINUED | OUTPATIENT
Start: 2020-01-06 | End: 2020-01-07

## 2020-01-06 RX ORDER — PANTOPRAZOLE SODIUM 20 MG/1
40 TABLET, DELAYED RELEASE ORAL
Refills: 0 | Status: DISCONTINUED | OUTPATIENT
Start: 2020-01-06 | End: 2020-01-06

## 2020-01-06 RX ORDER — DABIGATRAN ETEXILATE MESYLATE 150 MG/1
150 CAPSULE ORAL EVERY 12 HOURS
Refills: 0 | Status: DISCONTINUED | OUTPATIENT
Start: 2020-01-06 | End: 2020-01-07

## 2020-01-06 RX ORDER — PANTOPRAZOLE SODIUM 20 MG/1
40 TABLET, DELAYED RELEASE ORAL EVERY 12 HOURS
Refills: 0 | Status: DISCONTINUED | OUTPATIENT
Start: 2020-01-06 | End: 2020-01-07

## 2020-01-06 RX ORDER — LOSARTAN POTASSIUM 100 MG/1
50 TABLET, FILM COATED ORAL AT BEDTIME
Refills: 0 | Status: DISCONTINUED | OUTPATIENT
Start: 2020-01-06 | End: 2020-01-07

## 2020-01-06 RX ADMIN — DABIGATRAN ETEXILATE MESYLATE 150 MILLIGRAM(S): 150 CAPSULE ORAL at 21:47

## 2020-01-06 RX ADMIN — Medication 150 MILLIGRAM(S): at 19:51

## 2020-01-06 RX ADMIN — Medication 30 MILLILITER(S): at 17:09

## 2020-01-06 RX ADMIN — MORPHINE SULFATE 2 MILLIGRAM(S): 50 CAPSULE, EXTENDED RELEASE ORAL at 16:35

## 2020-01-06 RX ADMIN — LOSARTAN POTASSIUM 50 MILLIGRAM(S): 100 TABLET, FILM COATED ORAL at 21:47

## 2020-01-06 RX ADMIN — PANTOPRAZOLE SODIUM 40 MILLIGRAM(S): 20 TABLET, DELAYED RELEASE ORAL at 16:28

## 2020-01-06 NOTE — CHART NOTE - NSCHARTNOTEFT_GEN_A_CORE
Cardiac Electrophysiology Procedure Note  	  PROCEDURE:  Electrophysiological Study  Administration of Medicines  Intracardiac Ultrasound  Right and left atrial catheterization with dual transeptal approach  CryoAblation of pulmonary veins to treat Atrial Fibrillation  CS Cannulation  Computerized 3D mapping  Fluoroscopy      INDICATION:  SYmpotmatic PAF      OPERATORS:    Attending	 Varun Blake MD    MATERIALS    Sheath	Insertion Site	Catheter	Location  12 F	RFV	Flex Cath	LA and RA  7 Fr	RFV	Daig Decapolar CS	Coronary Sinus  11 Fr	LFV	ICE	RA      ABLATION CATHETERS    Diameter	Size	Type	System  12 F	28 mm	Arctic Front Cryoballoon	Cryo      BASELINE FINDINGS    Rhythm	CL / Rate	MT	QRS	QT	AH	HV  SR          850           180             89      390         105      48					    DESCRIPTION OF PROCEDURE    The patient was brought to the Procedure Room in a nonsedated and fasting state. Informed, written consent was obtained prior to the procedure. Anesthesia maintain comfort and analgesia throughout the procedure. Blood pressure, oxygenation and level of comfort were stable throughout. The right and left groin and right neck regions were cleaned and prepped with the applications of Chloraprep. Patient was then covered from head to toe with sterile drapes in the usual manner.     The left right inguinal areas and arterial pulse were defined; 30 cc of lidocaine solution were infiltrated into the skin overlying the area.     Next, using needle and guidewire, the right femoral vein was accessed once using modified Seldinger technique. All access was obtain using ultrasound guidance. The guidewires were followed up the IVC, right of the spine, to confirm venous access. One introducer sheath was placed into the femoral vein. The dilators and guidewires were removed. Then, using needle and guidewire, the left femoral vein was accessed Twice using modified Seldinger technique. The guidewires were followed up the IVC, right of the spine, to confirm venous access. Two introducer sheaths were placed into the femoral vein. The dilators and guidewires were removed. Esophageal temperature was monitored using esoahpgageal temperature probe. If Temperature decreased below 25C ablation was terminated.     Catheters were placed in the coronary sinus, at the high right atrial position, over the septal tricuspid valve area to obtain and confirm a proximal His signal, and at the RV apex. Diastolic thresholds were found to be adequate.   	  Baseline parameters were obtained and intervals were measured at these sites. Pacing at the HRA was performed to evaluate, AVN function and possible arrhythmia induction. Pacing performed at the RV apex to evaluate retrograde AVN function and evaluate for possible accessory pathways by evaluating retrograde atrial activation pattern and VH to A relationship.    A 10F AcuNav ICE ultrasound catheter was placed through the 11F sheath and positioned into the right atrium to allow visualization of the intra-atrial septum. One SL-1 sheaths were exchanged for the three 8F introducer sheath previously inserted in the right femoral vein. Single transeptal puncture was performed with BRK1 needle and one St. Mal 8 F degree SL-1 sheaths via the right femoral vein under direct visualization with intracardiac echocardiography and  fluoroscopy guidance. In both instances, a J wire was followed to the left subclavian vein and the sheath and dilator combo inserted to that level. The wire was exchanged for the BRK1 needle and pulled back under fluoroscopic visualization until the interatrial septum was reached. On all occasions, the sheath needle combo was placed over the septum and into the left atrium with needle advancement. Left atrial location was confirmed for each transeptal puncture with pressure monitoring, micro-bubble confirmation on ICE, after withdrawal of bright red blood from the catheter and with advancement of a guide wire into the left pulmonary vein. Left atrial pressure was measured at 10 mmHg. The SL-1 sheaths were exchanged over a J wire located in the left pulmonary vein for the Medtronic FlexCath adjustable catheter. Intravenous heparin was given prior to performing transeptal puncture and ACTs followed during the rest of the procedure to ensure an ACT greater than 300 secs.     Pulmonary veins diameter was assessed using a combination of ICE. A Lasso catheter was placed in the left atria and sequential mapping and ablation performed in the pulmonary veins using ICE and fluoroscopy guidance. KELSI 3D mapping was utilized to assess the position of the lasso catheter and visualized the CT scan of the LA to assess PV branches. Esophageal temperature was measure using a esophageal temperature probe and if temperature reached below 25 degrees, freezing was immediately terminated.    The achieve was inserted in to the one of the branches of each PV. The cryoballon (28 mm) was inflated and advanced to the os of each PV. Dye was injected and pressure was recorded to confirm adequate occlusion. Each PV was isolated. During the ablation, the patient did not experience symptoms. Each freeze was no longer than 3 minutes. If the slope of temperature descent was too steep or the temperature was < 50C the ablation was terminated. All PV were electrically isolated.    During the cryoablation of the right superior and inferior veins, CS catheter was inserted into the superior vena cava and the right phrenic nerve was paced at 1000ms cycle length. The movement of the diaphragm was manually monitored during the entire freeze and if the strength of contraction decreased the cryo was terminated.     Exit block was documented by pacing from the each pole of the lasso catheter and Entrance block was evident when pacing form the distal CS. All PV were electrically  isolated.        Ventricular pacing showed VA conduction at <300 ms CL. Atrial pacing showed AVN WBCL was 290 msec. The PV velocity by ICE Doppler was 0.5 m/sec for the LSPV and 0.4 m/sec for the LIPV at the end of the procedure. A limited transthorasic echocardiogram was performed and no evidence of significant pericardial effusion was seen.    The catheters were removed and the sheaths pulled after a figure-8 stitch was placed. A dry, sterile dressing was placed over this. The patient was returned to a hospital room in stable condition. Gauze and needle count were performed and found to be consistent at the end of the procedure      COMPLICATIONS:    The patient tolerated the procedure well. There were no immediate complications. No evidence of pulmonary vein stenosis.      CONCLUSIONS:    Successful Cryoablation of the left superior, right superior, left inferior and right inferior pulmonary veins with ablation of PV potentials and entrance / exit block as endpoints. No evidence of induced atrial fibrillation.

## 2020-01-07 ENCOUNTER — TRANSCRIPTION ENCOUNTER (OUTPATIENT)
Age: 55
End: 2020-01-07

## 2020-01-07 VITALS
OXYGEN SATURATION: 94 % | HEART RATE: 70 BPM | DIASTOLIC BLOOD PRESSURE: 65 MMHG | RESPIRATION RATE: 21 BRPM | SYSTOLIC BLOOD PRESSURE: 98 MMHG | TEMPERATURE: 98 F

## 2020-01-07 PROCEDURE — 93010 ELECTROCARDIOGRAM REPORT: CPT

## 2020-01-07 PROCEDURE — 99238 HOSP IP/OBS DSCHRG MGMT 30/<: CPT

## 2020-01-07 RX ORDER — DABIGATRAN ETEXILATE MESYLATE 150 MG/1
1 CAPSULE ORAL
Qty: 60 | Refills: 2
Start: 2020-01-07 | End: 2020-04-05

## 2020-01-07 RX ORDER — ASPIRIN/CALCIUM CARB/MAGNESIUM 324 MG
1 TABLET ORAL
Qty: 0 | Refills: 0 | DISCHARGE

## 2020-01-07 RX ORDER — ACETAMINOPHEN 500 MG
650 TABLET ORAL ONCE
Refills: 0 | Status: COMPLETED | OUTPATIENT
Start: 2020-01-07 | End: 2020-01-07

## 2020-01-07 RX ORDER — PANTOPRAZOLE SODIUM 20 MG/1
40 TABLET, DELAYED RELEASE ORAL
Qty: 1200 | Refills: 0
Start: 2020-01-07 | End: 2020-02-05

## 2020-01-07 RX ADMIN — Medication 150 MILLIGRAM(S): at 05:53

## 2020-01-07 RX ADMIN — Medication 650 MILLIGRAM(S): at 05:45

## 2020-01-07 RX ADMIN — PANTOPRAZOLE SODIUM 40 MILLIGRAM(S): 20 TABLET, DELAYED RELEASE ORAL at 05:53

## 2020-01-07 RX ADMIN — DABIGATRAN ETEXILATE MESYLATE 150 MILLIGRAM(S): 150 CAPSULE ORAL at 09:03

## 2020-01-07 RX ADMIN — Medication 650 MILLIGRAM(S): at 02:30

## 2020-01-07 RX ADMIN — AMLODIPINE BESYLATE 5 MILLIGRAM(S): 2.5 TABLET ORAL at 05:53

## 2020-01-07 NOTE — DISCHARGE NOTE PROVIDER - NSDCFUADDINST_GEN_ALL_CORE_FT
- Continue Pradaxa  - Do NOT stop blood thinners unless discussed with doctor  - Cont Protonix 40 mg daily x 30 days  - No heavy lifting > 10 lbs, squatting, or exertional activities for 5-7days  - Can take a shower starting tomorrow  - No submerging in water for 1 week  - No driving for 5 days

## 2020-01-07 NOTE — DISCHARGE NOTE PROVIDER - NSDCFUSCHEDAPPT_GEN_ALL_CORE_FT
IRMA BETHEA ; 01/13/2020 ; [facility] [address] IRMA BETHEA ; 01/13/2020 ; [facility] [address]

## 2020-01-07 NOTE — DISCHARGE NOTE PROVIDER - NSDCCPTREATMENT_GEN_ALL_CORE_FT
PRINCIPAL PROCEDURE  Procedure: Intracardiac catheter ablation for atrial fibrillation  Findings and Treatment:

## 2020-01-07 NOTE — DISCHARGE NOTE NURSING/CASE MANAGEMENT/SOCIAL WORK - PATIENT PORTAL LINK FT
You can access the FollowMyHealth Patient Portal offered by [facility] by registering at the following website: [email or web address]. By joining [facility]’s FollowMyHealth portal, you will also be able to view your health information using other applications (apps) compatible with our system.

## 2020-01-07 NOTE — DISCHARGE NOTE PROVIDER - HOSPITAL COURSE
This is a This is a 55 y/o male with PMH mitral regurgitation, AF, ESTEFANIA, HTN who presents for AF ablation, now POD #1. No immediate complications noted.

## 2020-01-07 NOTE — PROGRESS NOTE ADULT - ASSESSMENT
Discharge Instructions:  - Continue Eliquis / Xarelto / pradaxa / warfarin  - Do NOT stop blood thinners unless discussed with doctor  - Cont Protonix 40 mg daily x 30 days  - No heavy lifting > 10 lbs, squatting, or exertional activities for 5-7days  - Can take a shower starting tomorrow  - No submerging in water for 1 week  - No driving for 5 days  - FU in office in 3-4 weeks with  This is a 53 y/o male with PMH mitral regurgitation, AF, ESTEFANIA, HTN who presents for AF ablation, now POD #1. No immediate complications noted.     Plan:  - EKG complete, NSR 70 BPM  - Protonix 40 mg PO x 30 days  - Cont Pradaxa  - Cont Flecainide  - Dispo: Home today    Discharge Instructions:  - Continue Pradaxa  - Do NOT stop blood thinners unless discussed with doctor  - Cont Protonix 40 mg daily x 30 days  - No heavy lifting > 10 lbs, squatting, or exertional activities for 5-7days  - Can take a shower starting tomorrow  - No submerging in water for 1 week  - No driving for 5 days  - FU in office in 3-4 weeks with Dr. Blake

## 2020-01-07 NOTE — DISCHARGE NOTE NURSING/CASE MANAGEMENT/SOCIAL WORK - NSDCPEPRADAXA_GEN_ALL_CORE
Dabigatran/Pradaxa - Potential for adverse drug reactions and interactions/Dabigatran/Pradaxa - Compliance/Dabigatran/Pradaxa - Dietary Advice/Dabigatran/Pradaxa - Follow up monitoring

## 2020-01-07 NOTE — DISCHARGE NOTE PROVIDER - CARE PROVIDER_API CALL
Varun Blake; STACIE)  Cardiac Electrophysiology  [address] [place] [address]  Phone: [phone]  Fax: [phone]  Established Patient  Follow Up Time:

## 2020-01-07 NOTE — PROGRESS NOTE ADULT - SUBJECTIVE AND OBJECTIVE BOX
Electrophysiology Brief Post-Op Note      I have personally seen and examined the patient.  I agree with the history and physical which I have reviewed and noted any changes below.  01-06-20 @ 10:57    PRE-OP DIAGNOSIS: AF    POST-OP DIAGNOSIS:AF    PROCEDURE: af ablation     Physician: Hayden  Assistant: None    ANESTHESIA TYPE:  [X  ]General Anesthesia  [  ] Sedation  [ X ] Local/Regional    ESTIMATED BLOOD LOSS:  40     mL    CONDITION  [  ] Critical  [  ] Serious  [  ]Fair  [X  ]Good      SPECIMENS REMOVED (IF APPLICABLE):  none    IMPLANTS (IF APPLICABLE)  none    FINDINGS  PLAN OF CARE    -	Start pradaxa 150 mg tonight at 10 pm  -	Start protonix 40 mg daily tonight  -	Bed rest till am  -	Admit to telemetry
INTERVAL HPI/OVERNIGHT EVENTS:  No tele events overnight, currently SR 78 BPM. C/o chest discomfort. Patient denies fever, chills, dizziness, syncope, chest pain, palpitations, SOB, cough, abd pain, n/v/d/c, dysuria, hematuria or unusual rash. B/L groin sutures removed.     MEDICATIONS  (STANDING):  amLODIPine   Tablet 5 milliGRAM(s) Oral daily  dabigatran 150 milliGRAM(s) Oral every 12 hours  flecainide 150 milliGRAM(s) Oral two times a day  influenza   Vaccine 0.5 milliLiter(s) IntraMuscular once  losartan 50 milliGRAM(s) Oral at bedtime  pantoprazole  Injectable 40 milliGRAM(s) IV Push every 12 hours    MEDICATIONS  (PRN):      Allergies  No Known Allergies    Intolerances  Percocet 5/325 (Other)      REVIEW OF SYSTEMS  A ten-point review of systems was otherwise negative except as noted.      Vital Signs Last 24 Hrs  T(C): 37.1 (07 Jan 2020 08:00), Max: 37.1 (07 Jan 2020 08:00)  T(F): 98.7 (07 Jan 2020 08:00), Max: 98.7 (07 Jan 2020 08:00)  HR: 75 (07 Jan 2020 08:00) (68 - 82)  BP: 117/64 (07 Jan 2020 08:00) (104/65 - 139/85)  BP(mean): 84 (07 Jan 2020 08:00) (79 - 103)  RR: 14 (07 Jan 2020 08:00) (7 - 16)  SpO2: 94% (07 Jan 2020 08:00) (92% - 96%)    01-06-20 @ 07:01 - 01-07-20 @ 07:00  --------------------------------------------------------  IN: 770 mL / OUT: 800 mL / NET: -30 mL    01-07-20 @ 07:01  -  01-07-20 @ 08:33  --------------------------------------------------------  IN: 240 mL / OUT: 0 mL / NET: 240 mL    Physical Exam  GENERAL: In no apparent distress, well nourished, and hydrated.  HEART: Regular rate and rhythm; No murmurs, rubs, or gallops.  PULMONARY: Clear to auscultation and perfusion.  No rales, wheezing, or rhonchi bilaterally.  ABDOMEN: Soft, Nontender, Nondistended; Bowel sounds present  EXTREMITIES:  B/L groins without hematoma or drainage, soft to touch. 2+ Peripheral Pulses, No clubbing, cyanosis, or edema  NEUROLOGICAL: Grossly nonfocal. AO x3, WRIGHT, speech clear.    LABS:      RADIOLOGY & ADDITIONAL TESTS:

## 2020-01-07 NOTE — DISCHARGE NOTE PROVIDER - NSDCMRMEDTOKEN_GEN_ALL_CORE_FT
amLODIPine 5 mg oral tablet: 1 tab(s) orally once a day   candesartan 16 mg oral tablet: 1 tab(s) orally once a day (at bedtime)  dabigatran 150 mg oral capsule: 1 cap(s) orally every 12 hours MDD:2  flecainide 150 mg oral tablet: 1 tab(s) orally every 12 hours  pantoprazole 40 mg oral delayed release tablet: 40 tab(s) orally once a day MDD:1

## 2020-01-13 ENCOUNTER — APPOINTMENT (OUTPATIENT)
Dept: CARDIOLOGY | Facility: CLINIC | Age: 55
End: 2020-01-13
Payer: COMMERCIAL

## 2020-01-13 VITALS
BODY MASS INDEX: 32.64 KG/M2 | WEIGHT: 241 LBS | SYSTOLIC BLOOD PRESSURE: 122 MMHG | HEART RATE: 67 BPM | HEIGHT: 72 IN | DIASTOLIC BLOOD PRESSURE: 78 MMHG

## 2020-01-13 DIAGNOSIS — Z90.49 ACQUIRED ABSENCE OF OTHER SPECIFIED PARTS OF DIGESTIVE TRACT: ICD-10-CM

## 2020-01-13 DIAGNOSIS — I48.0 PAROXYSMAL ATRIAL FIBRILLATION: ICD-10-CM

## 2020-01-13 DIAGNOSIS — I10 ESSENTIAL (PRIMARY) HYPERTENSION: ICD-10-CM

## 2020-01-13 DIAGNOSIS — G47.33 OBSTRUCTIVE SLEEP APNEA (ADULT) (PEDIATRIC): ICD-10-CM

## 2020-01-13 DIAGNOSIS — I34.0 NONRHEUMATIC MITRAL (VALVE) INSUFFICIENCY: ICD-10-CM

## 2020-01-13 PROCEDURE — 99214 OFFICE O/P EST MOD 30 MIN: CPT

## 2020-01-13 PROCEDURE — 93000 ELECTROCARDIOGRAM COMPLETE: CPT

## 2020-01-13 NOTE — ASSESSMENT
[FreeTextEntry1] : Recurrent AF with RVR - s/p ablation with Dr. Blake\par BP is controlled\par Off Xarelto, on ASA.\par ESTEFANIA - using dental device - had sleep study - much better with the device\par C/w BP control, if able to loose weight, may be able to titrate the medications down.\par CHADS-VASC of 1.\par Echo reviewed.\par Prior w/u: stress test - negative. 2D echo - normal LV function.\par CT chest - no coronary calcium\par C/w flecainide to 150 mg q12 - EP will make a determination on when to stop the drug.\par \par F/u with Dr. Blake as scheduled.

## 2020-01-13 NOTE — PHYSICAL EXAM
[General Appearance - Well Developed] : well developed [Normal Appearance] : normal appearance [Well Groomed] : well groomed [General Appearance - Well Nourished] : well nourished [No Deformities] : no deformities [General Appearance - In No Acute Distress] : no acute distress [Normal Conjunctiva] : the conjunctiva exhibited no abnormalities [Normal Oral Mucosa] : normal oral mucosa [Normal Oropharynx] : normal oropharynx [Respiration, Rhythm And Depth] : normal respiratory rhythm and effort [Exaggerated Use Of Accessory Muscles For Inspiration] : no accessory muscle use [Heart Sounds] : normal S1 and S2 [Auscultation Breath Sounds / Voice Sounds] : lungs were clear to auscultation bilaterally [Heart Rate And Rhythm] : heart rate and rhythm were normal [Murmurs] : no murmurs present [Abdomen Tenderness] : non-tender [Edema] : no peripheral edema present [Abdomen Soft] : soft [Abdomen Mass (___ Cm)] : no abdominal mass palpated [] : no hepato-splenomegaly [Abnormal Walk] : normal gait [Cyanosis, Localized] : no localized cyanosis [Nail Clubbing] : no clubbing of the fingernails [Affect] : the affect was normal [FreeTextEntry1] : no pedal edema [Oriented To Time, Place, And Person] : oriented to person, place, and time [Skin Color & Pigmentation] : normal skin color and pigmentation [Mood] : the mood was normal [No Anxiety] : not feeling anxious

## 2020-01-13 NOTE — HISTORY OF PRESENT ILLNESS
[FreeTextEntry1] : 55 y/o male with PAF, ESTEFANIA, presents for f/u. No chest pain. On flecainide. Had seen Dr. Blake, ablation was done last week. He is recovering well. No palpitations. No syncope.  No bleeding. Previously could not tolerate Xarelto or Eliquis, but tolerated Pradaxa.No chest pain. BP is controlled. Occasional cough - he is wondering if this can be related to candesartan.

## 2020-02-28 ENCOUNTER — APPOINTMENT (OUTPATIENT)
Dept: CARDIOLOGY | Facility: CLINIC | Age: 55
End: 2020-02-28
Payer: COMMERCIAL

## 2020-02-28 VITALS
BODY MASS INDEX: 32.51 KG/M2 | HEIGHT: 72 IN | WEIGHT: 240 LBS | SYSTOLIC BLOOD PRESSURE: 129 MMHG | DIASTOLIC BLOOD PRESSURE: 86 MMHG | HEART RATE: 71 BPM

## 2020-02-28 PROCEDURE — 99214 OFFICE O/P EST MOD 30 MIN: CPT

## 2020-02-28 PROCEDURE — 93000 ELECTROCARDIOGRAM COMPLETE: CPT

## 2020-02-28 RX ORDER — FLECAINIDE ACETATE 150 MG/1
150 TABLET ORAL
Qty: 180 | Refills: 3 | Status: DISCONTINUED | COMMUNITY
Start: 2018-07-27 | End: 2020-02-28

## 2020-02-28 NOTE — ASSESSMENT
[FreeTextEntry1] : This is a 54-year-old patient, the patient Dr. Lawson, who desires to continue his medical care since Dr. Lawson retired. The patient denies chest pain, SOB, presyncope, palpitations, dizziness or syncope. He is complaint with his cardiac medications. \par \par AF - no signs of recurance\par - dc AAm\par - cont AC\par \par HTN - controlled\par \par \par

## 2020-02-28 NOTE — PHYSICAL EXAM
[General Appearance - Well Developed] : well developed [Normal Appearance] : normal appearance [Well Groomed] : well groomed [General Appearance - Well Nourished] : well nourished [No Deformities] : no deformities [General Appearance - In No Acute Distress] : no acute distress [Normal Conjunctiva] : the conjunctiva exhibited no abnormalities [Normal Oral Mucosa] : normal oral mucosa [Normal Oropharynx] : normal oropharynx [Respiration, Rhythm And Depth] : normal respiratory rhythm and effort [Auscultation Breath Sounds / Voice Sounds] : lungs were clear to auscultation bilaterally [Exaggerated Use Of Accessory Muscles For Inspiration] : no accessory muscle use [Heart Rate And Rhythm] : heart rate and rhythm were normal [Heart Sounds] : normal S1 and S2 [Murmurs] : no murmurs present [Abdomen Soft] : soft [Abdomen Tenderness] : non-tender [Abdomen Mass (___ Cm)] : no abdominal mass palpated [] : no hepato-splenomegaly [Nail Clubbing] : no clubbing of the fingernails [Abnormal Walk] : normal gait [FreeTextEntry1] : no pedal edema [Cyanosis, Localized] : no localized cyanosis [Oriented To Time, Place, And Person] : oriented to person, place, and time [Skin Color & Pigmentation] : normal skin color and pigmentation [Affect] : the affect was normal [Mood] : the mood was normal [No Anxiety] : not feeling anxious

## 2020-02-28 NOTE — HISTORY OF PRESENT ILLNESS
[facility] Brief Operative Note    Pre-operative diagnosis: Previous  section   Post-operative diagnosis Same   Procedure: Procedure(s):   SECTION   Surgeon(s): Surgeon(s) and Role:     * Brayden Menjivar MD - Primary     *Anita Jaffe MD - Secondary    Estimated blood loss: 900 cc 8:33 AM *    Specimens:   ID Type Source Tests Collected by Time Destination   1 :  Cord blood Blood OR DOCUMENTATION ONLY Brayden Menjivar MD 2018  8:02 AM       Findings: 7# 9 oz female with Apgars 8and 9  Multiple attempts with pop-offs before successful vacuum-assisted delivery  Normal adnexa      [FreeTextEntry1] : \par The patient has a history of mild mitral insufficiency, hypertension and hypercholesterolemia PAF sympotatic using flecanined  dx 3 yearsa a go. CHADVASC 1 Pt had back pain on eliqus and xarlto and lovenox. \par \par Pt s/p AF ablation 1/20. Pt feels great\par \par EKG SR 70 bpm

## 2020-03-31 ENCOUNTER — NON-APPOINTMENT (OUTPATIENT)
Age: 55
End: 2020-03-31

## 2020-04-13 ENCOUNTER — APPOINTMENT (OUTPATIENT)
Dept: CARDIOLOGY | Facility: CLINIC | Age: 55
End: 2020-04-13
Payer: COMMERCIAL

## 2020-04-13 PROCEDURE — 99213 OFFICE O/P EST LOW 20 MIN: CPT | Mod: 95

## 2020-04-13 NOTE — PHYSICAL EXAM
[General Appearance - Well Developed] : well developed [Normal Appearance] : normal appearance [Well Groomed] : well groomed [General Appearance - Well Nourished] : well nourished [No Deformities] : no deformities [General Appearance - In No Acute Distress] : no acute distress [Normal Conjunctiva] : the conjunctiva exhibited no abnormalities [FreeTextEntry1] : No JVD [] : no respiratory distress [Abnormal Walk] : normal gait [Oriented To Time, Place, And Person] : oriented to person, place, and time [Affect] : the affect was normal [Mood] : the mood was normal [No Anxiety] : not feeling anxious

## 2020-04-13 NOTE — ASSESSMENT
[FreeTextEntry1] : Recurrent AF with RVR - s/p ablation with Dr. Blake\par BP is controlled\par ESTEFANIA - using dental device - had sleep study - much better with the device\par C/w BP control, if able to loose weight, may be able to titrate the medications down.\par CHADS-VASC of 1.\par ELAINA reviewed.\par Prior w/u: stress test - negative. 2D echo - normal LV function.\par CT chest - no coronary calcium\par C/w flecainide to 150 mg q12 - plan to stop the drug by the end of April.\par Repeat echo in July. If still with episodes of palpitations, will consider event monitor.\par \par F/u with Dr. Blake as scheduled.

## 2020-04-13 NOTE — HISTORY OF PRESENT ILLNESS
[FreeTextEntry1] : Telemedicine Visit:\par \par 54 y/o male with PAF, ESTEFANIA, presents for f/u. No chest pain. On flecainide. Had seen Dr. Blake, ablation was done in January. He had an episode of palpitations in March - resolved with re-starting flecaininde. In NSR now. Patient have sent me tracings form his Apple Watch - NSR, sinus with PAC, no AF.  Occasional cough - he is wondering if this can be related to flecianinde.

## 2020-04-27 VITALS — BODY MASS INDEX: 31.83 KG/M2 | WEIGHT: 235 LBS | HEIGHT: 72 IN

## 2020-04-27 VITALS — HEART RATE: 64 BPM

## 2020-05-01 VITALS — HEIGHT: 72 IN | WEIGHT: 234 LBS | BODY MASS INDEX: 31.69 KG/M2

## 2020-05-07 ENCOUNTER — APPOINTMENT (OUTPATIENT)
Dept: CARDIOLOGY | Facility: CLINIC | Age: 55
End: 2020-05-07
Payer: COMMERCIAL

## 2020-05-07 VITALS — DIASTOLIC BLOOD PRESSURE: 85 MMHG | SYSTOLIC BLOOD PRESSURE: 137 MMHG

## 2020-05-07 PROCEDURE — 99214 OFFICE O/P EST MOD 30 MIN: CPT | Mod: 95

## 2020-05-07 RX ORDER — DABIGATRAN ETEXILATE MESYLATE 150 MG/1
150 CAPSULE ORAL TWICE DAILY
Qty: 180 | Refills: 0 | Status: DISCONTINUED | COMMUNITY
Start: 2020-01-09 | End: 2020-05-07

## 2020-05-07 NOTE — PHYSICAL EXAM
[General Appearance - Well Developed] : well developed [Well Groomed] : well groomed [Normal Appearance] : normal appearance [No Deformities] : no deformities [General Appearance - Well Nourished] : well nourished [General Appearance - In No Acute Distress] : no acute distress [Normal Conjunctiva] : the conjunctiva exhibited no abnormalities [Normal Oral Mucosa] : normal oral mucosa [Normal Oropharynx] : normal oropharynx [Abdomen Soft] : soft [Abdomen Tenderness] : non-tender [] : no hepato-splenomegaly [Abdomen Mass (___ Cm)] : no abdominal mass palpated [Abnormal Walk] : normal gait [Skin Color & Pigmentation] : normal skin color and pigmentation [Oriented To Time, Place, And Person] : oriented to person, place, and time [Affect] : the affect was normal [Mood] : the mood was normal [No Anxiety] : not feeling anxious [FreeTextEntry1] : No JVD, no bruit

## 2020-05-07 NOTE — HISTORY OF PRESENT ILLNESS
[FreeTextEntry1] : Mr. IRMA BETHEA has given me verbal authorization to provide the tele services\par Verbal consent given on 05/07/2020  by the patient.\par \par This visit was provided via telehealth using real-time 2-way audio visual technology. The patient,  Mr. IRMA BETHEA,   was located at home,  [address] [place] [address], at the time of the visit. \par \par The patient, Mr. IRMA BETHEA  and Provider participated in the telehealth encounter. \par \par I have spent 22 minutes speaking with or face-to-face discussing\par \par \par \par The patient has a history of mild mitral insufficiency, hypertension and hypercholesterolemia PAF sympotatic using flecainide  dx 3 yearsa a go. CHADVASC 1 Pt had back pain on eliqus and xarlto and lovenox. \par \par Pt s/p AF ablation 1/20. patient felt some palpitations lasting a second or 2 in the last one to 2 weeks.\par \par a rhythm strip from his iwatch watch showed normal rhythm with some APCs.

## 2020-05-07 NOTE — ASSESSMENT
[FreeTextEntry1] : This is a 54-year-old patient, the patient Dr. Lawson, who desires to continue his medical care since Dr. Lawson retired. The patient denies chest pain, SOB, presyncope, palpitations, dizziness or syncope. He is complaint with his cardiac medications. \par \par AF - no signs of recurance\par -  no signs of recurrence at this time. However given palpitations we will send patient a event monitor.\par - I. had a long discussion with patient as to the risk of stroke. I explained his risk is approximately 0.8%. Patient will like to stop anticoagulation.\par \par \par HTN - controlled\par \par \par

## 2020-05-26 ENCOUNTER — APPOINTMENT (OUTPATIENT)
Dept: CARDIOLOGY | Facility: CLINIC | Age: 55
End: 2020-05-26
Payer: COMMERCIAL

## 2020-05-26 PROCEDURE — 99213 OFFICE O/P EST LOW 20 MIN: CPT | Mod: 95

## 2020-05-26 NOTE — REASON FOR VISIT
[Follow-Up - Clinic] : a clinic follow-up of [Atrial Fibrillation] : atrial fibrillation [Mitral Regurgitation] : mitral regurgitation [Hypertension] : hypertension

## 2020-05-26 NOTE — ASSESSMENT
[FreeTextEntry1] : Recurrent AF with RVR - s/p ablation with Dr. Blake\par BP is controlled\par ESTEFANIA - using dental device - had sleep study - much better with the device\par C/w BP control, if able to loose weight, may be able to titrate the medications down.\par CHADS-VASC of 1.\par ELAINA reviewed.\par Prior w/u: stress test - negative. 2D echo - normal LV function.\par CT chest - no coronary calcium\par D/c flecainide.\par F/u [facility] results\par \par \par F/u with Dr. Blake as scheduled.

## 2020-05-26 NOTE — PHYSICAL EXAM
[General Appearance - Well Developed] : well developed [Normal Appearance] : normal appearance [General Appearance - Well Nourished] : well nourished [Well Groomed] : well groomed [General Appearance - In No Acute Distress] : no acute distress [No Deformities] : no deformities [Normal Conjunctiva] : the conjunctiva exhibited no abnormalities [] : no respiratory distress [FreeTextEntry1] : No JVD [Oriented To Time, Place, And Person] : oriented to person, place, and time [Affect] : the affect was normal [Mood] : the mood was normal [No Anxiety] : not feeling anxious

## 2020-05-26 NOTE — HISTORY OF PRESENT ILLNESS
[Home] : at home, [unfilled] , at the time of the visit. [Verbal consent obtained from patient] : the patient, [unfilled] [Medical Office: ([facility])___] : at the medical office located in  [FreeTextEntry1] : Telemedicine Visit:\par \par 54 y/o male with PAF, ESTEFANIA, presents for f/u. No chest pain. On flecainide. Had seen Dr. Blake, ablation was done in January. He had an episode of palpitations in March - resolved with re-starting flecaininde. In NSR now. Patient have sent me tracings form his Apple Watch - NSR, sinus with PAC, no AF. He is wearing BioTel patch now to assess for any recurrence. Off Pradaxa due to low CADS-VASC score.

## 2020-06-17 ENCOUNTER — APPOINTMENT (OUTPATIENT)
Dept: CARDIOLOGY | Facility: CLINIC | Age: 55
End: 2020-06-17

## 2020-06-26 ENCOUNTER — APPOINTMENT (OUTPATIENT)
Dept: CARDIOLOGY | Facility: CLINIC | Age: 55
End: 2020-06-26
Payer: COMMERCIAL

## 2020-06-26 VITALS — BODY MASS INDEX: 31.15 KG/M2 | WEIGHT: 230 LBS | HEIGHT: 72 IN

## 2020-06-26 VITALS — TEMPERATURE: 97.8 F | DIASTOLIC BLOOD PRESSURE: 88 MMHG | HEART RATE: 72 BPM | SYSTOLIC BLOOD PRESSURE: 132 MMHG

## 2020-06-26 PROCEDURE — 93000 ELECTROCARDIOGRAM COMPLETE: CPT

## 2020-06-26 PROCEDURE — 99214 OFFICE O/P EST MOD 30 MIN: CPT

## 2020-06-26 NOTE — REASON FOR VISIT
[Follow-Up - Clinic] : a clinic follow-up of [Mitral Regurgitation] : mitral regurgitation [Hypertension] : hypertension

## 2020-06-26 NOTE — ASSESSMENT
[FreeTextEntry1] : This is a 54-year-old patient, the patient Dr. Lawson, who desires to continue his medical care since Dr. Lawson retired. The patient denies chest pain, SOB, presyncope, palpitations, dizziness or syncope. He is complaint with his cardiac medications. \par \par AF - no signs of recurance\par -  NO AF on evetn monior\par \par Palpation likely related to APC/PVC\par - Discussed with patient lifestyle modifications which include coffee and energy drinks it. Will continue to monitor.\par – If patient continued to have further symptoms\par \par \par HTN - controlled\par \par \par

## 2020-06-26 NOTE — PHYSICAL EXAM
[Normal Appearance] : normal appearance [General Appearance - Well Developed] : well developed [Well Groomed] : well groomed [No Deformities] : no deformities [General Appearance - Well Nourished] : well nourished [General Appearance - In No Acute Distress] : no acute distress [Normal Conjunctiva] : the conjunctiva exhibited no abnormalities [Normal Oral Mucosa] : normal oral mucosa [Normal Oropharynx] : normal oropharynx [Abdomen Soft] : soft [Abdomen Tenderness] : non-tender [Abdomen Mass (___ Cm)] : no abdominal mass palpated [Skin Color & Pigmentation] : normal skin color and pigmentation [Abnormal Walk] : normal gait [Oriented To Time, Place, And Person] : oriented to person, place, and time [Affect] : the affect was normal [Mood] : the mood was normal [No Anxiety] : not feeling anxious [FreeTextEntry1] : No JVD, no bruit [] : no respiratory distress [Respiration, Rhythm And Depth] : normal respiratory rhythm and effort [Exaggerated Use Of Accessory Muscles For Inspiration] : no accessory muscle use [Auscultation Breath Sounds / Voice Sounds] : lungs were clear to auscultation bilaterally [Heart Rate And Rhythm] : heart rate and rhythm were normal [Heart Sounds] : normal S1 and S2 [Murmurs] : no murmurs present

## 2020-06-26 NOTE — HISTORY OF PRESENT ILLNESS
[FreeTextEntry1] : The patient has a history of mild mitral insufficiency, hypertension and hypercholesterolemia PAF sympotatic using flecainide  dx 3 yearsa a go. CHADVASC 1 Pt had back pain on eliqus and xarlto and lovenox. \par \par Pt s/p AF ablation 1/20. patient felt some palpitations lasting a second or 2 in the last one to 2 weeks.\par \par a rhythm strip from his iwatch watch showed normal rhythm with some APCs.\par \par EKG SR\par event monitor APC and PVC

## 2020-07-15 ENCOUNTER — APPOINTMENT (OUTPATIENT)
Dept: CARDIOLOGY | Facility: CLINIC | Age: 55
End: 2020-07-15

## 2020-07-20 ENCOUNTER — APPOINTMENT (OUTPATIENT)
Dept: CARDIOLOGY | Facility: CLINIC | Age: 55
End: 2020-07-20
Payer: COMMERCIAL

## 2020-07-20 ENCOUNTER — RESULT CHARGE (OUTPATIENT)
Age: 55
End: 2020-07-20

## 2020-07-20 VITALS
TEMPERATURE: 98.1 F | WEIGHT: 236 LBS | SYSTOLIC BLOOD PRESSURE: 122 MMHG | HEART RATE: 72 BPM | HEIGHT: 72 IN | BODY MASS INDEX: 31.97 KG/M2 | DIASTOLIC BLOOD PRESSURE: 68 MMHG

## 2020-07-20 PROCEDURE — 93000 ELECTROCARDIOGRAM COMPLETE: CPT

## 2020-07-20 PROCEDURE — 99213 OFFICE O/P EST LOW 20 MIN: CPT

## 2020-07-20 NOTE — ASSESSMENT
[FreeTextEntry1] : AF - s/p ablation with Dr. Blake. No recurrence.\par BP is controlled\par Off AC, on ASA.\par ESTEFANIA - using dental device - had sleep study - much better with the device\par C/w BP control, if able to loose weight, may be able to titrate the medications down.\par CHADS-VASC of 1.\par Echo reviewed.\par Prior w/u: stress test - negative. 2D echo - normal LV function. Mild MR.\par CT chest - no coronary calcium\par PAC's. If persistent will add B-blocker, but would hold off for now and monitor clinically.\par \par F/u with Dr. Blake as scheduled.

## 2020-07-20 NOTE — HISTORY OF PRESENT ILLNESS
[FreeTextEntry1] : 56 y/o male with PAF, ESTEFANIA, presents for f/u. No chest pain. No AF. Occasional PAC's.  No syncope.  No bleeding.  BP is controlled. Event monitor with Dr. DELCID - negative for AF.

## 2020-07-20 NOTE — PHYSICAL EXAM
[General Appearance - Well Developed] : well developed [Well Groomed] : well groomed [Normal Appearance] : normal appearance [General Appearance - Well Nourished] : well nourished [No Deformities] : no deformities [Normal Conjunctiva] : the conjunctiva exhibited no abnormalities [General Appearance - In No Acute Distress] : no acute distress [Normal Oropharynx] : normal oropharynx [Normal Oral Mucosa] : normal oral mucosa [Exaggerated Use Of Accessory Muscles For Inspiration] : no accessory muscle use [Respiration, Rhythm And Depth] : normal respiratory rhythm and effort [Heart Sounds] : normal S1 and S2 [Heart Rate And Rhythm] : heart rate and rhythm were normal [Auscultation Breath Sounds / Voice Sounds] : lungs were clear to auscultation bilaterally [Murmurs] : no murmurs present [Edema] : no peripheral edema present [Abdomen Soft] : soft [Abdomen Tenderness] : non-tender [] : no hepato-splenomegaly [Abdomen Mass (___ Cm)] : no abdominal mass palpated [Abnormal Walk] : normal gait [Cyanosis, Localized] : no localized cyanosis [Nail Clubbing] : no clubbing of the fingernails [FreeTextEntry1] : no pedal edema [Skin Color & Pigmentation] : normal skin color and pigmentation [Oriented To Time, Place, And Person] : oriented to person, place, and time [Affect] : the affect was normal [Mood] : the mood was normal [No Anxiety] : not feeling anxious

## 2021-01-11 ENCOUNTER — APPOINTMENT (OUTPATIENT)
Dept: CARDIOLOGY | Facility: CLINIC | Age: 56
End: 2021-01-11
Payer: COMMERCIAL

## 2021-01-11 VITALS
HEART RATE: 84 BPM | HEIGHT: 72 IN | TEMPERATURE: 99 F | SYSTOLIC BLOOD PRESSURE: 124 MMHG | WEIGHT: 240 LBS | BODY MASS INDEX: 32.51 KG/M2 | DIASTOLIC BLOOD PRESSURE: 70 MMHG

## 2021-01-11 PROCEDURE — 99072 ADDL SUPL MATRL&STAF TM PHE: CPT

## 2021-01-11 PROCEDURE — 93000 ELECTROCARDIOGRAM COMPLETE: CPT

## 2021-01-11 PROCEDURE — 99214 OFFICE O/P EST MOD 30 MIN: CPT

## 2021-01-11 RX ORDER — AMLODIPINE BESYLATE 2.5 MG/1
2.5 TABLET ORAL DAILY
Qty: 90 | Refills: 0 | Status: DISCONTINUED | COMMUNITY
Start: 2020-07-27 | End: 2021-01-11

## 2021-01-11 RX ORDER — AMLODIPINE BESYLATE 2.5 MG/1
2.5 TABLET ORAL DAILY
Qty: 90 | Refills: 0 | Status: DISCONTINUED | COMMUNITY
End: 2021-01-11

## 2021-01-11 RX ORDER — ASPIRIN 325 MG/1
325 TABLET, FILM COATED ORAL
Refills: 0 | Status: DISCONTINUED | COMMUNITY
End: 2021-01-11

## 2021-01-11 NOTE — HISTORY OF PRESENT ILLNESS
[FreeTextEntry1] : 56 y/o male with PAF, ESTEFANIA, presents for f/u. No chest pain. No AF. Occasional PAC's.  No syncope.  No bleeding.  BP is controlled. Event monitor with Dr. DELCID - negative for AF. Had multiple strips from the apple watch - NSR, PAC's.

## 2021-01-11 NOTE — PHYSICAL EXAM
[General Appearance - Well Developed] : well developed [Normal Appearance] : normal appearance [Well Groomed] : well groomed [General Appearance - Well Nourished] : well nourished [No Deformities] : no deformities [General Appearance - In No Acute Distress] : no acute distress [Normal Conjunctiva] : the conjunctiva exhibited no abnormalities [Respiration, Rhythm And Depth] : normal respiratory rhythm and effort [Exaggerated Use Of Accessory Muscles For Inspiration] : no accessory muscle use [Auscultation Breath Sounds / Voice Sounds] : lungs were clear to auscultation bilaterally [Heart Rate And Rhythm] : heart rate and rhythm were normal [Heart Sounds] : normal S1 and S2 [Murmurs] : no murmurs present [Edema] : no peripheral edema present [Abdomen Soft] : soft [Abdomen Tenderness] : non-tender [] : no hepato-splenomegaly [Abdomen Mass (___ Cm)] : no abdominal mass palpated [Abnormal Walk] : normal gait [Nail Clubbing] : no clubbing of the fingernails [Cyanosis, Localized] : no localized cyanosis [FreeTextEntry1] : no pedal edema [Skin Color & Pigmentation] : normal skin color and pigmentation [Oriented To Time, Place, And Person] : oriented to person, place, and time [Affect] : the affect was normal [Mood] : the mood was normal [No Anxiety] : not feeling anxious

## 2021-01-11 NOTE — ASSESSMENT
[FreeTextEntry1] : AF - s/p ablation with Dr. Blake. No recurrence.\par BP is controlled\par Off AC, on ASA.\par ESTEFANIA - using dental device - had sleep study - much better with the device\par C/w BP control, if able to loose weight, may be able to titrate the medications down.\par CHADS-VASC of 1.\par Echo reviewed.\par Prior w/u: stress test - negative. 2D echo - normal LV function. Mild MR.\par CT chest - no coronary calcium\par PAC's. If persistent will try B-blocker, and monitor clinically.\par He has been using flecainide with relief - will f/u with Dr. Blake to discuss.\par \par

## 2021-02-10 ENCOUNTER — INPATIENT (INPATIENT)
Facility: HOSPITAL | Age: 56
LOS: 0 days | Discharge: HOME | End: 2021-02-11
Attending: HOSPITALIST | Admitting: HOSPITALIST
Payer: COMMERCIAL

## 2021-02-10 ENCOUNTER — NON-APPOINTMENT (OUTPATIENT)
Age: 56
End: 2021-02-10

## 2021-02-10 VITALS
HEIGHT: 72 IN | DIASTOLIC BLOOD PRESSURE: 105 MMHG | TEMPERATURE: 98 F | WEIGHT: 240.08 LBS | OXYGEN SATURATION: 100 % | RESPIRATION RATE: 18 BRPM | HEART RATE: 149 BPM | SYSTOLIC BLOOD PRESSURE: 176 MMHG

## 2021-02-10 DIAGNOSIS — I10 ESSENTIAL (PRIMARY) HYPERTENSION: ICD-10-CM

## 2021-02-10 DIAGNOSIS — Z90.49 ACQUIRED ABSENCE OF OTHER SPECIFIED PARTS OF DIGESTIVE TRACT: Chronic | ICD-10-CM

## 2021-02-10 DIAGNOSIS — I48.91 UNSPECIFIED ATRIAL FIBRILLATION: ICD-10-CM

## 2021-02-10 DIAGNOSIS — Z98.890 OTHER SPECIFIED POSTPROCEDURAL STATES: Chronic | ICD-10-CM

## 2021-02-10 LAB
ALBUMIN SERPL ELPH-MCNC: 4.6 G/DL — SIGNIFICANT CHANGE UP (ref 3.5–5.2)
ALP SERPL-CCNC: 55 U/L — SIGNIFICANT CHANGE UP (ref 30–115)
ALT FLD-CCNC: 36 U/L — SIGNIFICANT CHANGE UP (ref 0–41)
ANION GAP SERPL CALC-SCNC: 9 MMOL/L — SIGNIFICANT CHANGE UP (ref 7–14)
APTT BLD: 35.3 SEC — SIGNIFICANT CHANGE UP (ref 27–39.2)
AST SERPL-CCNC: 25 U/L — SIGNIFICANT CHANGE UP (ref 0–41)
BASOPHILS # BLD AUTO: 0.05 K/UL — SIGNIFICANT CHANGE UP (ref 0–0.2)
BASOPHILS NFR BLD AUTO: 0.7 % — SIGNIFICANT CHANGE UP (ref 0–1)
BILIRUB SERPL-MCNC: 0.4 MG/DL — SIGNIFICANT CHANGE UP (ref 0.2–1.2)
BUN SERPL-MCNC: 14 MG/DL — SIGNIFICANT CHANGE UP (ref 10–20)
CALCIUM SERPL-MCNC: 9.3 MG/DL — SIGNIFICANT CHANGE UP (ref 8.5–10.1)
CHLORIDE SERPL-SCNC: 101 MMOL/L — SIGNIFICANT CHANGE UP (ref 98–110)
CO2 SERPL-SCNC: 28 MMOL/L — SIGNIFICANT CHANGE UP (ref 17–32)
CREAT SERPL-MCNC: 1 MG/DL — SIGNIFICANT CHANGE UP (ref 0.7–1.5)
EOSINOPHIL # BLD AUTO: 0.14 K/UL — SIGNIFICANT CHANGE UP (ref 0–0.7)
EOSINOPHIL NFR BLD AUTO: 2.1 % — SIGNIFICANT CHANGE UP (ref 0–8)
GLUCOSE SERPL-MCNC: 116 MG/DL — HIGH (ref 70–99)
HCT VFR BLD CALC: 47.6 % — SIGNIFICANT CHANGE UP (ref 42–52)
HGB BLD-MCNC: 15.7 G/DL — SIGNIFICANT CHANGE UP (ref 14–18)
IMM GRANULOCYTES NFR BLD AUTO: 0.4 % — HIGH (ref 0.1–0.3)
INR BLD: 1.05 RATIO — SIGNIFICANT CHANGE UP (ref 0.65–1.3)
LYMPHOCYTES # BLD AUTO: 1.54 K/UL — SIGNIFICANT CHANGE UP (ref 1.2–3.4)
LYMPHOCYTES # BLD AUTO: 22.9 % — SIGNIFICANT CHANGE UP (ref 20.5–51.1)
MCHC RBC-ENTMCNC: 30.5 PG — SIGNIFICANT CHANGE UP (ref 27–31)
MCHC RBC-ENTMCNC: 33 G/DL — SIGNIFICANT CHANGE UP (ref 32–37)
MCV RBC AUTO: 92.4 FL — SIGNIFICANT CHANGE UP (ref 80–94)
MONOCYTES # BLD AUTO: 0.58 K/UL — SIGNIFICANT CHANGE UP (ref 0.1–0.6)
MONOCYTES NFR BLD AUTO: 8.6 % — SIGNIFICANT CHANGE UP (ref 1.7–9.3)
NEUTROPHILS # BLD AUTO: 4.38 K/UL — SIGNIFICANT CHANGE UP (ref 1.4–6.5)
NEUTROPHILS NFR BLD AUTO: 65.3 % — SIGNIFICANT CHANGE UP (ref 42.2–75.2)
NRBC # BLD: 0 /100 WBCS — SIGNIFICANT CHANGE UP (ref 0–0)
NT-PROBNP SERPL-SCNC: 49 PG/ML — SIGNIFICANT CHANGE UP (ref 0–300)
PLATELET # BLD AUTO: 298 K/UL — SIGNIFICANT CHANGE UP (ref 130–400)
POTASSIUM SERPL-MCNC: 3.7 MMOL/L — SIGNIFICANT CHANGE UP (ref 3.5–5)
POTASSIUM SERPL-SCNC: 3.7 MMOL/L — SIGNIFICANT CHANGE UP (ref 3.5–5)
PROT SERPL-MCNC: 7.3 G/DL — SIGNIFICANT CHANGE UP (ref 6–8)
PROTHROM AB SERPL-ACNC: 12.1 SEC — SIGNIFICANT CHANGE UP (ref 9.95–12.87)
RAPID RVP RESULT: SIGNIFICANT CHANGE UP
RBC # BLD: 5.15 M/UL — SIGNIFICANT CHANGE UP (ref 4.7–6.1)
RBC # FLD: 12.5 % — SIGNIFICANT CHANGE UP (ref 11.5–14.5)
SARS-COV-2 RNA SPEC QL NAA+PROBE: SIGNIFICANT CHANGE UP
SODIUM SERPL-SCNC: 138 MMOL/L — SIGNIFICANT CHANGE UP (ref 135–146)
TROPONIN T SERPL-MCNC: <0.01 NG/ML — SIGNIFICANT CHANGE UP
WBC # BLD: 6.72 K/UL — SIGNIFICANT CHANGE UP (ref 4.8–10.8)
WBC # FLD AUTO: 6.72 K/UL — SIGNIFICANT CHANGE UP (ref 4.8–10.8)

## 2021-02-10 PROCEDURE — 71045 X-RAY EXAM CHEST 1 VIEW: CPT | Mod: 26

## 2021-02-10 PROCEDURE — 99285 EMERGENCY DEPT VISIT HI MDM: CPT

## 2021-02-10 PROCEDURE — 99223 1ST HOSP IP/OBS HIGH 75: CPT

## 2021-02-10 RX ORDER — DILTIAZEM HCL 120 MG
20 CAPSULE, EXT RELEASE 24 HR ORAL ONCE
Refills: 0 | Status: COMPLETED | OUTPATIENT
Start: 2021-02-10 | End: 2021-02-10

## 2021-02-10 RX ORDER — SODIUM CHLORIDE 9 MG/ML
1000 INJECTION INTRAMUSCULAR; INTRAVENOUS; SUBCUTANEOUS ONCE
Refills: 0 | Status: COMPLETED | OUTPATIENT
Start: 2021-02-10 | End: 2021-02-10

## 2021-02-10 RX ORDER — PANTOPRAZOLE SODIUM 20 MG/1
40 TABLET, DELAYED RELEASE ORAL
Refills: 0 | Status: DISCONTINUED | OUTPATIENT
Start: 2021-02-10 | End: 2021-02-11

## 2021-02-10 RX ORDER — LOSARTAN POTASSIUM 100 MG/1
50 TABLET, FILM COATED ORAL DAILY
Refills: 0 | Status: DISCONTINUED | OUTPATIENT
Start: 2021-02-10 | End: 2021-02-11

## 2021-02-10 RX ORDER — METOPROLOL TARTRATE 50 MG
5 TABLET ORAL ONCE
Refills: 0 | Status: DISCONTINUED | OUTPATIENT
Start: 2021-02-10 | End: 2021-02-10

## 2021-02-10 RX ORDER — ASPIRIN/CALCIUM CARB/MAGNESIUM 324 MG
325 TABLET ORAL DAILY
Refills: 0 | Status: DISCONTINUED | OUTPATIENT
Start: 2021-02-10 | End: 2021-02-11

## 2021-02-10 RX ORDER — ENOXAPARIN SODIUM 100 MG/ML
40 INJECTION SUBCUTANEOUS DAILY
Refills: 0 | Status: DISCONTINUED | OUTPATIENT
Start: 2021-02-10 | End: 2021-02-11

## 2021-02-10 RX ORDER — ASPIRIN/CALCIUM CARB/MAGNESIUM 324 MG
1 TABLET ORAL
Qty: 0 | Refills: 0 | DISCHARGE

## 2021-02-10 RX ORDER — AMLODIPINE BESYLATE 2.5 MG/1
2.5 TABLET ORAL DAILY
Refills: 0 | Status: DISCONTINUED | OUTPATIENT
Start: 2021-02-10 | End: 2021-02-11

## 2021-02-10 RX ADMIN — SODIUM CHLORIDE 1000 MILLILITER(S): 9 INJECTION INTRAMUSCULAR; INTRAVENOUS; SUBCUTANEOUS at 18:17

## 2021-02-10 RX ADMIN — Medication 20 MILLIGRAM(S): at 18:17

## 2021-02-10 NOTE — ED PROVIDER NOTE - NS ED ROS FT
Constitutional: no fever, chills, no recent weight loss, change in appetite or malaise  Eyes: no redness/discharge/pain/vision changes  ENT: no rhinorrhea/ear pain/sore throat  Cardiac: + sob/palpitations. No chest pain or edema.  Respiratory: No cough or respiratory distress  GI: No nausea, vomiting, diarrhea or abdominal pain.  : No dysuria, frequency, urgency or hematuria  MS: no pain to back or extremities, no loss of ROM, no weakness  Neuro: No headache or weakness. No LOC.  Skin: No skin rash.  Endocrine: No history of thyroid disease or diabetes.  Except as documented in the HPI, all other systems are negative.

## 2021-02-10 NOTE — ED ADULT TRIAGE NOTE - CHIEF COMPLAINT QUOTE
pt states he has a history of Afib with ablation January of last years, and developed PAC in the last year, states he monitors heart with apple watch and was notified that his heart rate was elevated. denies chest pain, but felt heart racing x 2 hours PTA

## 2021-02-10 NOTE — H&P ADULT - NSICDXFAMILYHX_GEN_ALL_CORE_FT
FAMILY HISTORY:  No pertinent family history in first degree relatives     FAMILY HISTORY:  FH: breast cancer  FH: hypertension

## 2021-02-10 NOTE — ED PROVIDER NOTE - OBJECTIVE STATEMENT
55 year old M with hx of a fib s/p ablation x 1 year ago (Dr. Blake), HTN, ESTEFANIA, MR c/o palpitations x 2 hours ago. Pt sts felt like his a fib so took 100mg Flecainide without relief. Pt no longer on AC. + mild sob. No chest pain, fever/chills, cough, uri symptoms, leg pain/swelling, smoking hx, etoh/drug use.

## 2021-02-10 NOTE — H&P ADULT - HISTORY OF PRESENT ILLNESS
55 yr old male with PMHx of HTN, ESTEFANIA, paroxysmal afib s/p ablation (01/2020) presented to ER with rapid heart rate. Similar episodes have happened in the past, likely triggered by uncontrolled ESTEFANIA. Pt has failed multiple cpap masks in the past and currently uses a sleep device. He is now returning bc he was noncompliant with his medications and went back into afib.  55 yr old male with PMHx of HTN, ESTEFANIA, paroxysmal afib s/p ablation (01/2020) presented to ER for rapid heart rate associated with lightheadedness. As per patient today afternoon he suddenly developed palpitation associated lightheadedness. His apple watch was showing . He took one dose of flecainide with no improvement so presented to ER.    IN ER: pt in Af with RVR, HR in the 180s s/p Cardizem push which broke to NSR

## 2021-02-10 NOTE — H&P ADULT - NSICDXPASTMEDICALHX_GEN_ALL_CORE_FT
PAST MEDICAL HISTORY:  Atrial fibrillation paroxysmal x 1yr    Hypertension, unspecified type     Mitral regurgitation     Sleep apnea no cpap uses oral mouth appliance

## 2021-02-10 NOTE — ED PROVIDER NOTE - PHYSICAL EXAMINATION
CONSTITUTIONAL: Well-appearing; well-nourished; in no apparent distress.   EYES: PERRL; EOM intact.   ENT: normal nose; no rhinorrhea; normal pharynx with no tonsillar hypertrophy.   NECK: Supple; non-tender; no cervical lymphadenopathy.    CARDIOVASCULAR: + tachycardia. + irregular rhythm   RESPIRATORY: Normal chest excursion with respiration; breath sounds clear and equal bilaterally; no wheezes, rhonchi, or rales.  GI/: Normal bowel sounds; non-distended; non-tender; no palpable organomegaly.   MS: No evidence of trauma or deformity.  Normal ROM in all four extremities; non-tender to palpation; distal pulses are normal.   SKIN: Normal for age and race; warm; dry; good turgor; no apparent lesions or exudate.   Extrem: no peripheral edema.  NEURO/PSYCH: A & O x 4; grossly unremarkable. mood and manner are appropriate.

## 2021-02-10 NOTE — ED PROVIDER NOTE - ATTENDING CONTRIBUTION TO CARE
54 yo M pmh of HTN, HLD, a fib sp ablation presents with palpitations and tachycardia. States that few hours started to have some palpitations, got an alarm by his Iwatch that he was having tachycardia. States that 1 year ago Dr. Montgomery did a successful ablasion. No additional episodes of tachycardia. Had PACs afterwards. Today felt palpitations, took a dose of flecanaide that he had at home without improvement. no chest pain, no n/v/d, no abdominal pain, no shortness of breath.     CONSTITUTIONAL: Well-developed; well-nourished; in no acute distress.   SKIN: warm, dry  HEAD: Normocephalic; atraumatic.  EYES: PERRL, EOMI, no conjunctival erythema  ENT: No nasal discharge; airway clear.  NECK: Supple; non tender.  CARD: S1, S2 normal;  Regular rate and rhythm.   RESP: No wheezes, rales or rhonchi.  ABD: soft non tender, non distended, no rebound or guarding  EXT: Normal ROM.  5/5 strength in all 4 extremities   LYMPH: No acute cervical adenopathy.  NEURO: Alert, oriented, grossly unremarkable. neurovascularly intact  PSYCH: Cooperative, appropriate.

## 2021-02-10 NOTE — H&P ADULT - NSHPPHYSICALEXAM_GEN_ALL_CORE
T(C): 36.7 (02-10-21 @ 17:51), Max: 36.7 (02-10-21 @ 17:51)  HR: 143 (02-10-21 @ 18:52) (143 - 149)  BP: 158/93 (02-10-21 @ 18:52) (158/93 - 176/105)  RR: 18 (02-10-21 @ 18:52) (18 - 18)  SpO2: 100% (02-10-21 @ 18:52) (100% - 100%)        GENERAL: NAD, well-developed  HEAD:  Atraumatic, Normocephalic  EYES: EOMI, PERRLA, conjunctiva and sclera clear  ENT: Normal tympanic membrane. No nasal obstruction or discharge. No tonsillar exudate, swelling or erythema.  NECK: Supple, No JVD  CHEST/LUNG: Clear to auscultation bilaterally; No wheeze  HEART: Regular rate and rhythm; No murmurs, rubs, or gallops  ABDOMEN: Soft, Nontender, Nondistended; Bowel sounds present  EXTREMITIES:  2+ Peripheral Pulses, No clubbing, cyanosis, or edema  PSYCH: AAOx3  NEUROLOGY: non-focal  SKIN: No rashes or lesions T(C): 36.7 (02-10-21 @ 17:51), Max: 36.7 (02-10-21 @ 17:51)  HR: 143 (02-10-21 @ 18:52) (143 - 149)  BP: 158/93 (02-10-21 @ 18:52) (158/93 - 176/105)  RR: 18 (02-10-21 @ 18:52) (18 - 18)  SpO2: 100% (02-10-21 @ 18:52) (100% - 100%)        GENERAL: NAD, well-developed  HEAD:  Atraumatic, Normocephalic  EYES: EOMI, PERRLA, conjunctiva and sclera clear  ENT: Normal tympanic membrane. No nasal obstruction or discharge. No tonsillar exudate, swelling or erythema.  NECK: Supple, No JVD    CHEST/LUNG: Clear to auscultation bilaterally; No wheeze  HEART: Regular rate and rhythm; No murmurs, rubs, or gallops  ABDOMEN: Soft, Nontender, Nondistended; Bowel sounds present  EXTREMITIES:  2+ Peripheral Pulses, No clubbing, cyanosis, or edema  PSYCH: AAOx3  NEUROLOGY: non-focal  SKIN: No rashes or lesions T(C): 36.7 (02-10-21 @ 17:51), Max: 36.7 (02-10-21 @ 17:51)  HR: 69 (02-11-21 @ 00:25) (69 - 149)  BP: 141/92 (02-11-21 @ 00:25) (141/92 - 176/105)  RR: 18 (02-10-21 @ 18:52) (18 - 18)  SpO2: 100% (02-10-21 @ 18:52) (100% - 100%)    GENERAL: NAD, well-developed  HEAD:  Atraumatic, Normocephalic  EYES: EOMI, PERRLA, conjunctiva and sclera clear  ENT: Normal tympanic membrane. No nasal obstruction or discharge. No tonsillar exudate, swelling or erythema.  NECK: Supple, No JVD  CHEST/LUNG: Clear to auscultation bilaterally; No wheeze  HEART: Regular rate and rhythm; No murmurs, rubs, or gallops  ABDOMEN: Soft, Nontender, Nondistended; Bowel sounds present  EXTREMITIES:  2+ Peripheral Pulses, No clubbing, cyanosis, or edema  PSYCH: AAOx3  NEUROLOGY: non-focal  SKIN: No rashes or lesions

## 2021-02-10 NOTE — ED PROVIDER NOTE - CLINICAL SUMMARY MEDICAL DECISION MAKING FREE TEXT BOX
Patient presents with palpitations. Found to have a fib rvr, Cardizem and fluids given. HR improved. Admitted for further management.

## 2021-02-10 NOTE — H&P ADULT - ATTENDING COMMENTS
55 yr old male with PMHx of HTN, ESTEFANIA, paroxysmal afib s/p ablation (01/2020) presented to ER with rapid heart rate    # Paroxysmal Afib with rvr 2/2 noncompliance  - start Cardizem drip   - pt needs better estefania control  - CHADS-VASc 1 --> no need to A/C  - cardiology consult      # HTN  - bp elevated   - c/w amlodipine, losartan and Cardizem    # ESTEFANIA   -     # DVT ppx  - start Lovenox     # Ambulate as tolerated    # DASH diet    # Full code 55 yr old male with PMHx of HTN, ESTEFANIA, paroxysmal afib s/p ablation (01/2020) presented to ER with rapid heart rate    # Paroxysmal Afib with RVR  - rate controlled, pt in NSR HR: 70s (s/p Cardizem IV push in ER)  - c/w Cardizem   - CHADS-VASc 1 --> no need to A/C  - c/w ASA   - ESTEFANIA control  - cardiology consult      # HTN  - bp elevated   - c/w losartan  - c/w amlodipine 2.5mg and start Cardizem 30mg daily     # ESTEFANIA   - c/w oral appliance (jaw lift)    # DVT ppx  - start Lovenox     # Ambulate as tolerated    # DASH diet    # Full code 55 yr old male with PMHx of HTN, ESTEFANIA, paroxysmal afib s/p ablation (01/2020) presented to ER with rapid heart rate    # Paroxysmal Afib with RVR  - rate controlled, pt in NSR HR: 70s (s/p Cardizem IV push in ER)  - c/w Cardizem   - CHADS-VASc 1 --> no need to A/C  - c/w ASA   - ESTEFANIA control  - check ECHO   - cardiology consult      # HTN  - bp elevated   - c/w losartan  - c/w amlodipine 2.5mg and start Cardizem 30mg daily     # ESTEFANIA   - c/w oral appliance (jaw lift)    # DVT ppx  - start Lovenox     # Ambulate as tolerated    # DASH diet    # Full code

## 2021-02-10 NOTE — H&P ADULT - NSHPLABSRESULTS_GEN_ALL_CORE
15.7   6.72  )-----------( 298      ( 10 Feb 2021 18:15 )             47.6       02-10    138  |  101  |  14  ----------------------------<  116<H>  3.7   |  28  |  1.0    Ca    9.3      10 Feb 2021 18:15    TPro  7.3  /  Alb  4.6  /  TBili  0.4  /  DBili  x   /  AST  25  /  ALT  36  /  AlkPhos  55  02-10                  PT/INR - ( 10 Feb 2021 18:15 )   PT: 12.10 sec;   INR: 1.05 ratio         PTT - ( 10 Feb 2021 18:15 )  PTT:35.3 sec    Lactate Trend      CARDIAC MARKERS ( 10 Feb 2021 18:15 )  x     / <0.01 ng/mL / x     / x     / x            CAPILLARY BLOOD GLUCOSE

## 2021-02-11 ENCOUNTER — TRANSCRIPTION ENCOUNTER (OUTPATIENT)
Age: 56
End: 2021-02-11

## 2021-02-11 VITALS
RESPIRATION RATE: 18 BRPM | TEMPERATURE: 97 F | DIASTOLIC BLOOD PRESSURE: 88 MMHG | HEART RATE: 75 BPM | SYSTOLIC BLOOD PRESSURE: 131 MMHG

## 2021-02-11 LAB
ANION GAP SERPL CALC-SCNC: 7 MMOL/L — SIGNIFICANT CHANGE UP (ref 7–14)
BASOPHILS # BLD AUTO: 0.04 K/UL — SIGNIFICANT CHANGE UP (ref 0–0.2)
BASOPHILS NFR BLD AUTO: 0.6 % — SIGNIFICANT CHANGE UP (ref 0–1)
BUN SERPL-MCNC: 12 MG/DL — SIGNIFICANT CHANGE UP (ref 10–20)
CALCIUM SERPL-MCNC: 9.3 MG/DL — SIGNIFICANT CHANGE UP (ref 8.5–10.1)
CHLORIDE SERPL-SCNC: 105 MMOL/L — SIGNIFICANT CHANGE UP (ref 98–110)
CO2 SERPL-SCNC: 30 MMOL/L — SIGNIFICANT CHANGE UP (ref 17–32)
CREAT SERPL-MCNC: 0.9 MG/DL — SIGNIFICANT CHANGE UP (ref 0.7–1.5)
EOSINOPHIL # BLD AUTO: 0.1 K/UL — SIGNIFICANT CHANGE UP (ref 0–0.7)
EOSINOPHIL NFR BLD AUTO: 1.6 % — SIGNIFICANT CHANGE UP (ref 0–8)
GLUCOSE SERPL-MCNC: 105 MG/DL — HIGH (ref 70–99)
HCT VFR BLD CALC: 47 % — SIGNIFICANT CHANGE UP (ref 42–52)
HCV AB S/CO SERPL IA: 0.03 COI — SIGNIFICANT CHANGE UP
HCV AB SERPL-IMP: SIGNIFICANT CHANGE UP
HGB BLD-MCNC: 15.6 G/DL — SIGNIFICANT CHANGE UP (ref 14–18)
IMM GRANULOCYTES NFR BLD AUTO: 0.2 % — SIGNIFICANT CHANGE UP (ref 0.1–0.3)
LYMPHOCYTES # BLD AUTO: 1.27 K/UL — SIGNIFICANT CHANGE UP (ref 1.2–3.4)
LYMPHOCYTES # BLD AUTO: 19.8 % — LOW (ref 20.5–51.1)
MAGNESIUM SERPL-MCNC: 2.4 MG/DL — SIGNIFICANT CHANGE UP (ref 1.8–2.4)
MCHC RBC-ENTMCNC: 31.3 PG — HIGH (ref 27–31)
MCHC RBC-ENTMCNC: 33.2 G/DL — SIGNIFICANT CHANGE UP (ref 32–37)
MCV RBC AUTO: 94.2 FL — HIGH (ref 80–94)
MONOCYTES # BLD AUTO: 0.58 K/UL — SIGNIFICANT CHANGE UP (ref 0.1–0.6)
MONOCYTES NFR BLD AUTO: 9 % — SIGNIFICANT CHANGE UP (ref 1.7–9.3)
NEUTROPHILS # BLD AUTO: 4.43 K/UL — SIGNIFICANT CHANGE UP (ref 1.4–6.5)
NEUTROPHILS NFR BLD AUTO: 68.8 % — SIGNIFICANT CHANGE UP (ref 42.2–75.2)
NRBC # BLD: 0 /100 WBCS — SIGNIFICANT CHANGE UP (ref 0–0)
PLATELET # BLD AUTO: 291 K/UL — SIGNIFICANT CHANGE UP (ref 130–400)
POTASSIUM SERPL-MCNC: 5 MMOL/L — SIGNIFICANT CHANGE UP (ref 3.5–5)
POTASSIUM SERPL-SCNC: 5 MMOL/L — SIGNIFICANT CHANGE UP (ref 3.5–5)
RBC # BLD: 4.99 M/UL — SIGNIFICANT CHANGE UP (ref 4.7–6.1)
RBC # FLD: 12.8 % — SIGNIFICANT CHANGE UP (ref 11.5–14.5)
SARS-COV-2 IGG SERPL QL IA: NEGATIVE — SIGNIFICANT CHANGE UP
SARS-COV-2 IGM SERPL IA-ACNC: 0.06 INDEX — SIGNIFICANT CHANGE UP
SODIUM SERPL-SCNC: 142 MMOL/L — SIGNIFICANT CHANGE UP (ref 135–146)
TROPONIN T SERPL-MCNC: <0.01 NG/ML — SIGNIFICANT CHANGE UP
WBC # BLD: 6.43 K/UL — SIGNIFICANT CHANGE UP (ref 4.8–10.8)
WBC # FLD AUTO: 6.43 K/UL — SIGNIFICANT CHANGE UP (ref 4.8–10.8)

## 2021-02-11 PROCEDURE — 99238 HOSP IP/OBS DSCHRG MGMT 30/<: CPT

## 2021-02-11 RX ORDER — FLECAINIDE ACETATE 50 MG
1 TABLET ORAL
Qty: 0 | Refills: 0 | DISCHARGE

## 2021-02-11 RX ORDER — AMLODIPINE BESYLATE 2.5 MG/1
1 TABLET ORAL
Qty: 0 | Refills: 0 | DISCHARGE

## 2021-02-11 RX ORDER — DILTIAZEM HCL 120 MG
30 CAPSULE, EXT RELEASE 24 HR ORAL DAILY
Refills: 0 | Status: DISCONTINUED | OUTPATIENT
Start: 2021-02-11 | End: 2021-02-11

## 2021-02-11 RX ORDER — LOSARTAN POTASSIUM 100 MG/1
50 TABLET, FILM COATED ORAL AT BEDTIME
Refills: 0 | Status: DISCONTINUED | OUTPATIENT
Start: 2021-02-11 | End: 2021-02-11

## 2021-02-11 RX ORDER — INFLUENZA VIRUS VACCINE 15; 15; 15; 15 UG/.5ML; UG/.5ML; UG/.5ML; UG/.5ML
0.5 SUSPENSION INTRAMUSCULAR ONCE
Refills: 0 | Status: COMPLETED | OUTPATIENT
Start: 2021-02-11 | End: 2021-02-11

## 2021-02-11 RX ADMIN — PANTOPRAZOLE SODIUM 40 MILLIGRAM(S): 20 TABLET, DELAYED RELEASE ORAL at 05:44

## 2021-02-11 RX ADMIN — INFLUENZA VIRUS VACCINE 0.5 MILLILITER(S): 15; 15; 15; 15 SUSPENSION INTRAMUSCULAR at 11:48

## 2021-02-11 RX ADMIN — LOSARTAN POTASSIUM 50 MILLIGRAM(S): 100 TABLET, FILM COATED ORAL at 01:18

## 2021-02-11 RX ADMIN — Medication 30 MILLIGRAM(S): at 05:44

## 2021-02-11 NOTE — DISCHARGE NOTE PROVIDER - HOSPITAL COURSE
yr old male with PMHx of HTN, ESTEFANIA, uses dental appliance paroxysmal afib s/p ablation (01/2020) presented to ER for rapid heart rate associated with lightheadedness. As per patient today afternoon he suddenly developed palpitation associated lightheadedness. His apple watch was showing . He took one dose of flecainide with no improvement so presented to ER.   HR in the 180s s/p Cardizem push which broke to NSR    Cardiology consulted  Pt is hemodynamically stable for DC to follow up outpaient with Cards and EP

## 2021-02-11 NOTE — DISCHARGE NOTE PROVIDER - CARE PROVIDER_API CALL
Varun Blake; STACIE)  [facility]  [address] [place] [address]  Phone: [phone]  Fax: [phone]  Follow Up Time:

## 2021-02-11 NOTE — CONSULT NOTE ADULT - SUBJECTIVE AND OBJECTIVE BOX
CARDIOLOGY CONSULT NOTE     CHIEF COMPLAINT/REASON FOR CONSULT:    HPI:  55 yr old male with PMHx of HTN, ESTEFANIA, uses dental appliance paroxysmal afib s/p ablation (01/2020) presented to ER for rapid heart rate associated with lightheadedness. As per patient today afternoon he suddenly developed palpitation associated lightheadedness. His apple watch was showing . He took one dose of flecainide with no improvement so presented to ER.    IN ER:  HR in the 180s s/p Cardizem push which broke to NSR  (10 Feb 2021 21:36)      PAST MEDICAL & SURGICAL HISTORY:  Mitral regurgitation    Atrial fibrillation  paroxysmal x 1yr    Sleep apnea  no cpap uses oral mouth appliance    Hypertension, unspecified type    History of appendectomy  1987    Absence of gallbladder  2013        Cardiac Risks:   [x ]HTN, [ ] DM, [ ] Smoking, [ ] FH,  [ ] Lipids        MEDICATIONS:  MEDICATIONS  (STANDING):  aspirin enteric coated 325 milliGRAM(s) Oral daily  diltiazem    Tablet 30 milliGRAM(s) Oral daily  enoxaparin Injectable 40 milliGRAM(s) SubCutaneous daily  losartan 50 milliGRAM(s) Oral at bedtime  pantoprazole    Tablet 40 milliGRAM(s) Oral before breakfast      FAMILY HISTORY:  FH: breast cancer    FH: hypertension        SOCIAL HISTORY:      [ ] Marital status   Allergies    No Known Allergies        Percocet 5/325 (Other)  	    REVIEW OF SYSTEMS:  CONSTITUTIONAL: No fever, weight loss, or fatigue  EYES: No eye pain, visual disturbances, or discharge  ENMT:  No difficulty hearing, tinnitus, vertigo; No sinus or throat pain  NECK: No pain or stiffness  RESPIRATORY: No cough, wheezing, chills or hemoptysis; No Shortness of Breath  CARDIOVASCULAR: See above  GASTROINTESTINAL: No abdominal or epigastric pain. No nausea, vomiting, or hematemesis; No diarrhea or constipation. No melena or hematochezia.  GENITOURINARY: No dysuria, frequency, hematuria, or incontinence  NEUROLOGICAL: No headaches, memory loss, loss of strength, numbness, or tremors  SKIN: No itching, burning, rashes, or lesions   	    PHYSICAL EXAM:  T(C): 36.1 (02-11-21 @ 06:00), Max: 36.7 (02-10-21 @ 17:51)  HR: 74 (02-11-21 @ 09:09) (69 - 149)  BP: 153/93 (02-11-21 @ 09:09) (141/92 - 176/105)  RR: 18 (02-11-21 @ 09:09) (18 - 20)  SpO2: 97% (02-11-21 @ 09:09) (97% - 100%)  Wt(kg): --  I&O's Summary      Appearance: Normal	  Psychiatry: A & O x 3, Mood & affect appropriate  HEENT:   Normal oral mucosa, PERRL, EOMI	  Lymphatic: No lymphadenopathy  Cardiovascular: Normal S1 S2,RRR, No JVD, No murmurs  Respiratory: Lungs clear to auscultation	  Gastrointestinal:  Soft, Non-tender, + BS	  Skin: No rashes, No ecchymoses, No cyanosis	  Neurologic: Non-focal  Extremities: Normal range of motion, No clubbing, cyanosis or edema  Vascular: Peripheral pulses palpable 2+ bilaterally      ECG:  sinus	    	  LABS:	 	    CARDIAC MARKERS:          Serum Pro-Brain Natriuretic Peptide: 49 pg/mL (02-10 @ 18:15)                            15.6   6.43  )-----------( 291      ( 11 Feb 2021 07:38 )             47.0     02-11    142  |  105  |  12  ----------------------------<  105<H>  5.0   |  30  |  0.9    Ca    9.3      11 Feb 2021 07:38  Mg     2.4     02-11    TPro  7.3  /  Alb  4.6  /  TBili  0.4  /  DBili  x   /  AST  25  /  ALT  36  /  AlkPhos  55  02-10    PT/INR - ( 10 Feb 2021 18:15 )   PT: 12.10 sec;   INR: 1.05 ratio         PTT - ( 10 Feb 2021 18:15 )  PTT:35.3 sec  proBNP: Serum Pro-Brain Natriuretic Peptide: 49 pg/mL (02-10 @ 18:15)

## 2021-02-11 NOTE — CONSULT NOTE ADULT - ASSESSMENT
Patient with hx paf . He had ablation saige 1/2020. Braden uses dental appliance. He presented with svt . broke with iv cardizem No chest pain. Now NSR. If stable ambulate. To see outpatient Dr Montgomery in one week

## 2021-02-11 NOTE — DISCHARGE NOTE PROVIDER - NSDCMRMEDTOKEN_GEN_ALL_CORE_FT
aspirin 325 mg oral delayed release tablet: 1 tab(s) orally once a day  candesartan 16 mg oral tablet: 1 tab(s) orally once a day (at bedtime)  pantoprazole 40 mg oral delayed release tablet: 40 tab(s) orally once a day MDD:1

## 2021-02-11 NOTE — DISCHARGE NOTE PROVIDER - NSDCCPCAREPLAN_GEN_ALL_CORE_FT
PRINCIPAL DISCHARGE DIAGNOSIS  Diagnosis: Atrial fibrillation with RVR  Assessment and Plan of Treatment:

## 2021-02-11 NOTE — DISCHARGE NOTE NURSING/CASE MANAGEMENT/SOCIAL WORK - PATIENT PORTAL LINK FT
You can access the FollowMyHealth Patient Portal offered by [facility] by registering at the following website: [email or web address]. By joining [facility]’s FollowMyHealth portal, you will also be able to view your health information using other applications (apps) compatible with our system.

## 2021-02-16 DIAGNOSIS — I34.0 NONRHEUMATIC MITRAL (VALVE) INSUFFICIENCY: ICD-10-CM

## 2021-02-16 DIAGNOSIS — I48.0 PAROXYSMAL ATRIAL FIBRILLATION: ICD-10-CM

## 2021-02-16 DIAGNOSIS — I10 ESSENTIAL (PRIMARY) HYPERTENSION: ICD-10-CM

## 2021-02-16 DIAGNOSIS — G47.33 OBSTRUCTIVE SLEEP APNEA (ADULT) (PEDIATRIC): ICD-10-CM

## 2021-02-16 DIAGNOSIS — I47.1 SUPRAVENTRICULAR TACHYCARDIA: ICD-10-CM

## 2021-02-16 DIAGNOSIS — R00.2 PALPITATIONS: ICD-10-CM

## 2021-02-16 DIAGNOSIS — Z88.5 ALLERGY STATUS TO NARCOTIC AGENT: ICD-10-CM

## 2021-02-19 ENCOUNTER — APPOINTMENT (OUTPATIENT)
Dept: CARDIOLOGY | Facility: CLINIC | Age: 56
End: 2021-02-19
Payer: COMMERCIAL

## 2021-02-19 VITALS
SYSTOLIC BLOOD PRESSURE: 151 MMHG | WEIGHT: 240 LBS | HEART RATE: 87 BPM | BODY MASS INDEX: 32.55 KG/M2 | DIASTOLIC BLOOD PRESSURE: 108 MMHG

## 2021-02-19 PROCEDURE — 99072 ADDL SUPL MATRL&STAF TM PHE: CPT

## 2021-02-19 PROCEDURE — 93000 ELECTROCARDIOGRAM COMPLETE: CPT

## 2021-02-19 PROCEDURE — 99214 OFFICE O/P EST MOD 30 MIN: CPT

## 2021-02-19 NOTE — ASSESSMENT
[FreeTextEntry1] : This is a 56-year-old patient, the patient Dr. Lawson, who desires to continue his medical care since Dr. Lawson retired. The patient denies chest pain, SOB, presyncope, palpitations, dizziness or syncope. He is complaint with his cardiac medications. \par \par AF - no signs of recurrence\par -  NO AF on event monitor\par - if pt has another episodes he can use flecainide as pill-pocket\par \par Palpation likely related to APC/PVC\par – If patient continued to have further symptoms\par - pt could not tolerate BB will try quick release CCB\par \par \par \par \par \par

## 2021-02-19 NOTE — HISTORY OF PRESENT ILLNESS
[FreeTextEntry1] : The patient has a history of mild mitral insufficiency, hypertension and hypercholesterolemia PAF asymptotic using flecainide  dx 3 yearsa a go. CHADVASC 1. ESTEFANIA with a oral appliance.  Pt had back pain on eliqus and xarlto and lovenox. \par \par Pt s/p AF ablation 1/20. patient felt some palpitations lasting a second or 2 in the last one to 2 weeks.\par \par a rhythm strip from his iwatch watch showed PAF episode - first episode of AF. Patient took flecainide and went to ER, AF stopped in ER\par \par \par EKG SR 85 bpm\par event monitor APC and PVC

## 2021-02-19 NOTE — PHYSICAL EXAM
[General Appearance - Well Developed] : well developed [Normal Appearance] : normal appearance [Well Groomed] : well groomed [General Appearance - Well Nourished] : well nourished [No Deformities] : no deformities [General Appearance - In No Acute Distress] : no acute distress [Normal Conjunctiva] : the conjunctiva exhibited no abnormalities [Normal Oral Mucosa] : normal oral mucosa [Normal Oropharynx] : normal oropharynx [FreeTextEntry1] : No JVD, no bruit [Respiration, Rhythm And Depth] : normal respiratory rhythm and effort [Exaggerated Use Of Accessory Muscles For Inspiration] : no accessory muscle use [Auscultation Breath Sounds / Voice Sounds] : lungs were clear to auscultation bilaterally [Heart Rate And Rhythm] : heart rate and rhythm were normal [Heart Sounds] : normal S1 and S2 [Murmurs] : no murmurs present [Abdomen Soft] : soft [Abdomen Tenderness] : non-tender [] : no hepato-splenomegaly [Abdomen Mass (___ Cm)] : no abdominal mass palpated [Abnormal Walk] : normal gait [Skin Color & Pigmentation] : normal skin color and pigmentation [Oriented To Time, Place, And Person] : oriented to person, place, and time [Affect] : the affect was normal [Mood] : the mood was normal [No Anxiety] : not feeling anxious

## 2021-04-06 ENCOUNTER — APPOINTMENT (OUTPATIENT)
Dept: CARDIOLOGY | Facility: CLINIC | Age: 56
End: 2021-04-06
Payer: COMMERCIAL

## 2021-04-06 VITALS
BODY MASS INDEX: 32.51 KG/M2 | HEART RATE: 72 BPM | SYSTOLIC BLOOD PRESSURE: 126 MMHG | TEMPERATURE: 98.7 F | OXYGEN SATURATION: 98 % | DIASTOLIC BLOOD PRESSURE: 84 MMHG | RESPIRATION RATE: 16 BRPM | HEIGHT: 72 IN | WEIGHT: 240 LBS

## 2021-04-06 PROCEDURE — 99072 ADDL SUPL MATRL&STAF TM PHE: CPT

## 2021-04-06 PROCEDURE — 99214 OFFICE O/P EST MOD 30 MIN: CPT

## 2021-04-06 PROCEDURE — 93000 ELECTROCARDIOGRAM COMPLETE: CPT

## 2021-04-06 NOTE — PHYSICAL EXAM
[Normal Appearance] : normal appearance [General Appearance - Well Developed] : well developed [Well Groomed] : well groomed [General Appearance - Well Nourished] : well nourished [No Deformities] : no deformities [General Appearance - In No Acute Distress] : no acute distress [Normal Conjunctiva] : the conjunctiva exhibited no abnormalities [Respiration, Rhythm And Depth] : normal respiratory rhythm and effort [Exaggerated Use Of Accessory Muscles For Inspiration] : no accessory muscle use [Auscultation Breath Sounds / Voice Sounds] : lungs were clear to auscultation bilaterally [Heart Rate And Rhythm] : heart rate and rhythm were normal [Heart Sounds] : normal S1 and S2 [Murmurs] : no murmurs present [Edema] : no peripheral edema present [Abdomen Soft] : soft [Abdomen Tenderness] : non-tender [] : no hepato-splenomegaly [Abdomen Mass (___ Cm)] : no abdominal mass palpated [Abnormal Walk] : normal gait [Nail Clubbing] : no clubbing of the fingernails [Cyanosis, Localized] : no localized cyanosis [FreeTextEntry1] : no pedal edema [Skin Color & Pigmentation] : normal skin color and pigmentation [Oriented To Time, Place, And Person] : oriented to person, place, and time [Affect] : the affect was normal [Mood] : the mood was normal [No Anxiety] : not feeling anxious

## 2021-04-06 NOTE — HISTORY OF PRESENT ILLNESS
[FreeTextEntry1] : 57 y/o male with PAF, ESTEFANIA, presents for f/u. No chest pain. No AF. Occasional PAC's.  No syncope.  No bleeding.  BP is controlled. Event monitor with Dr. DELCID - negative for AF. Had multiple strips from the apple watch - NSR, PAC's. Was in ER in February 2021- had one episode of AF. Converted after the flecainide and Cardizem. Still with frequent PAC's. BP was elevated on several occasions.

## 2021-04-06 NOTE — ASSESSMENT
[FreeTextEntry1] : AF - s/p ablation with Dr. Blake. One episode of recurrence in February - none since then.\par Off AC, on ASA.\par ESTEFANIA - using dental device - had sleep study - much better with the device - f/u with pulmonary\par C/w BP control, if able to loose weight, may be able to titrate the medications down.\par He feels PAC's are more frequent - he has been using flecainide for PAC suppression.\par In the meantime, will add Cardizem for both PAC and BP control.\par CHADS-VASC of 1.\par Echo reviewed.\par Prior w/u: stress test - negative. 2D echo - normal LV function. Mild MR.\par CT chest - no coronary calcium\par PAC's. Could not tolerate B-blocker, try Cardizem and and monitor clinically.\par He has been using flecainide with relief - will f/u with Dr. Blake to discuss long term use. If this is the only agent that is effective - may need to use on the permanent basis.\par Plan discussed.\par F/u in 3 months.\par \par

## 2021-07-06 ENCOUNTER — APPOINTMENT (OUTPATIENT)
Dept: CARDIOLOGY | Facility: CLINIC | Age: 56
End: 2021-07-06

## 2021-07-09 ENCOUNTER — APPOINTMENT (OUTPATIENT)
Dept: CARDIOLOGY | Facility: CLINIC | Age: 56
End: 2021-07-09
Payer: COMMERCIAL

## 2021-07-09 VITALS
SYSTOLIC BLOOD PRESSURE: 150 MMHG | DIASTOLIC BLOOD PRESSURE: 98 MMHG | HEIGHT: 78 IN | OXYGEN SATURATION: 95 % | TEMPERATURE: 98.7 F | WEIGHT: 240 LBS | RESPIRATION RATE: 16 BRPM | BODY MASS INDEX: 27.77 KG/M2 | HEART RATE: 68 BPM

## 2021-07-09 PROCEDURE — 99214 OFFICE O/P EST MOD 30 MIN: CPT

## 2021-07-09 PROCEDURE — 93000 ELECTROCARDIOGRAM COMPLETE: CPT

## 2021-07-09 RX ORDER — DILTIAZEM HYDROCHLORIDE 30 MG/1
30 TABLET ORAL
Qty: 30 | Refills: 0 | Status: DISCONTINUED | COMMUNITY
Start: 2021-02-19 | End: 2021-07-09

## 2021-07-09 RX ORDER — DILTIAZEM HYDROCHLORIDE 120 MG/1
120 CAPSULE, EXTENDED RELEASE ORAL
Qty: 30 | Refills: 3 | Status: DISCONTINUED | COMMUNITY
Start: 2021-04-06 | End: 2021-07-09

## 2021-07-09 RX ORDER — AMLODIPINE BESYLATE 5 MG/1
5 TABLET ORAL DAILY
Qty: 14 | Refills: 0 | Status: DISCONTINUED | COMMUNITY
Start: 2021-05-24 | End: 2021-07-09

## 2021-07-16 ENCOUNTER — APPOINTMENT (OUTPATIENT)
Dept: CARDIOLOGY | Facility: CLINIC | Age: 56
End: 2021-07-16
Payer: COMMERCIAL

## 2021-07-16 PROCEDURE — 93015 CV STRESS TEST SUPVJ I&R: CPT

## 2021-08-02 ENCOUNTER — APPOINTMENT (OUTPATIENT)
Dept: CARDIOLOGY | Facility: CLINIC | Age: 56
End: 2021-08-02
Payer: COMMERCIAL

## 2021-08-02 VITALS
SYSTOLIC BLOOD PRESSURE: 120 MMHG | TEMPERATURE: 98.5 F | HEIGHT: 72 IN | BODY MASS INDEX: 32.91 KG/M2 | WEIGHT: 243 LBS | DIASTOLIC BLOOD PRESSURE: 80 MMHG | HEART RATE: 57 BPM

## 2021-08-02 PROCEDURE — 93000 ELECTROCARDIOGRAM COMPLETE: CPT

## 2021-08-02 PROCEDURE — 99213 OFFICE O/P EST LOW 20 MIN: CPT

## 2021-08-02 NOTE — REASON FOR VISIT
[Arrhythmia/ECG Abnorrmalities] : arrhythmia/ECG abnormalities [Follow-Up - Clinic] : a clinic follow-up of [Atrial Fibrillation] : atrial fibrillation [Hypertension] : hypertension [Mitral Regurgitation] : mitral regurgitation [Palpitations] : palpitations

## 2021-08-02 NOTE — ASSESSMENT
[FreeTextEntry1] : AF - s/p ablation with Dr. Blake. One episode of recurrence in February - none since then.\par Off AC, on ASA.\par ESTEFANIA - using dental device - had sleep study - much better with the device - f/u with pulmonary\par C/w BP control, if able to loose weight, may be able to titrate the medications down.\par He feels PAC's are more frequent - he has been using flecainide for PAC suppression.\par In the meantime, will add Cardizem for both PAC and BP control.\par CHADS-VASC of 1.\par Echo reviewed.\par Prior w/u: stress test - negative. 2D echo - normal LV function. Mild MR.\par CT chest - no coronary calcium\par PAC's. Could not tolerate B-blocker, but now using low dsoe metoprolol - tolerating thus far.\par He has been using flecainide with relief - will f/u with Dr. Blake to discuss long term use. If this is the only agent that is effective - may need to consider additional ablation to come off.\par Plan discussed.\par F/u in 6 months.\par \par

## 2021-08-08 LAB
ANION GAP SERPL CALC-SCNC: 11 MMOL/L
BASOPHILS # BLD AUTO: 0.05 K/UL
BASOPHILS NFR BLD AUTO: 0.7 %
BUN SERPL-MCNC: 16 MG/DL
CALCIUM SERPL-MCNC: 9.4 MG/DL
CHLORIDE SERPL-SCNC: 100 MMOL/L
CO2 SERPL-SCNC: 28 MMOL/L
CREAT SERPL-MCNC: 1.1 MG/DL
EOSINOPHIL # BLD AUTO: 0.11 K/UL
EOSINOPHIL NFR BLD AUTO: 1.5 %
GLUCOSE SERPL-MCNC: 95 MG/DL
HCT VFR BLD CALC: 48.1 %
HGB BLD-MCNC: 15.8 G/DL
IMM GRANULOCYTES NFR BLD AUTO: 0.5 %
LYMPHOCYTES # BLD AUTO: 1.51 K/UL
LYMPHOCYTES NFR BLD AUTO: 20.4 %
MAN DIFF?: NORMAL
MCHC RBC-ENTMCNC: 31.4 PG
MCHC RBC-ENTMCNC: 32.8 G/DL
MCV RBC AUTO: 95.6 FL
MONOCYTES # BLD AUTO: 0.71 K/UL
MONOCYTES NFR BLD AUTO: 9.6 %
NEUTROPHILS # BLD AUTO: 4.99 K/UL
NEUTROPHILS NFR BLD AUTO: 67.3 %
PLATELET # BLD AUTO: 297 K/UL
POTASSIUM SERPL-SCNC: 4.5 MMOL/L
RBC # BLD: 5.03 M/UL
RBC # FLD: 13 %
SODIUM SERPL-SCNC: 139 MMOL/L
WBC # FLD AUTO: 7.41 K/UL

## 2021-10-17 NOTE — HISTORY OF PRESENT ILLNESS
[FreeTextEntry1] : The patient has a history of mild mitral insufficiency, hypertension and hypercholesterolemia PAF asymptotic using flecainide  dx 3 yearsa a go. CHADVASC 1. ESTEFANIA with a oral appliance.  Pt had back pain on eliqus and xarlto and lovenox. \par \par Pt s/p AF ablation 1/20. patient felt some palpitations lasting a second or 2 in the last one to 2 weeks.\par \par a rhythm strip from his iwatch watch showed PAF episode - first episode of AF. Patient took flecainide and went to ER, AF stopped in ER\par \par Patient flecainide was increased and is doing well. No palpation. \par \par EKG SR 85 bpm\par event monitor APC and PVC

## 2021-10-17 NOTE — ASSESSMENT
[FreeTextEntry1] : This is a 56-year-old patient, the patient Dr. Lawson, who desires to continue his medical care since Dr. Lawson retired. The patient denies chest pain, SOB, presyncope, palpitations, dizziness or syncope. He is complaint with his cardiac medications. \par \par AF - no signs of recurrence\par - start metoprolol 25 mg \par - stress test stephanie Brunson\par - may consider ablation in January\par - visit in November\par \par \par \par \par \par

## 2021-10-17 NOTE — PHYSICAL EXAM
[General Appearance - Well Developed] : well developed [Normal Appearance] : normal appearance [Well Groomed] : well groomed [General Appearance - Well Nourished] : well nourished [No Deformities] : no deformities [General Appearance - In No Acute Distress] : no acute distress [Normal Conjunctiva] : the conjunctiva exhibited no abnormalities [Normal Oral Mucosa] : normal oral mucosa [Normal Oropharynx] : normal oropharynx [Respiration, Rhythm And Depth] : normal respiratory rhythm and effort [Exaggerated Use Of Accessory Muscles For Inspiration] : no accessory muscle use [Auscultation Breath Sounds / Voice Sounds] : lungs were clear to auscultation bilaterally [Heart Rate And Rhythm] : heart rate and rhythm were normal [Heart Sounds] : normal S1 and S2 [Murmurs] : no murmurs present [Abdomen Soft] : soft [Abdomen Tenderness] : non-tender [] : no hepato-splenomegaly [Abdomen Mass (___ Cm)] : no abdominal mass palpated [Abnormal Walk] : normal gait [Skin Color & Pigmentation] : normal skin color and pigmentation [Oriented To Time, Place, And Person] : oriented to person, place, and time [Affect] : the affect was normal [Mood] : the mood was normal [No Anxiety] : not feeling anxious [FreeTextEntry1] : No JVD, no bruit

## 2021-10-21 ENCOUNTER — APPOINTMENT (OUTPATIENT)
Dept: CARDIOLOGY | Facility: CLINIC | Age: 56
End: 2021-10-21
Payer: COMMERCIAL

## 2021-10-21 VITALS
OXYGEN SATURATION: 99 % | TEMPERATURE: 98.2 F | RESPIRATION RATE: 16 BRPM | HEIGHT: 72 IN | BODY MASS INDEX: 29.8 KG/M2 | SYSTOLIC BLOOD PRESSURE: 132 MMHG | HEART RATE: 64 BPM | WEIGHT: 220 LBS | DIASTOLIC BLOOD PRESSURE: 88 MMHG

## 2021-10-21 DIAGNOSIS — Z80.8 FAMILY HISTORY OF MALIGNANT NEOPLASM OF OTHER ORGANS OR SYSTEMS: ICD-10-CM

## 2021-10-21 DIAGNOSIS — Z80.42 FAMILY HISTORY OF MALIGNANT NEOPLASM OF PROSTATE: ICD-10-CM

## 2021-10-21 PROCEDURE — 99214 OFFICE O/P EST MOD 30 MIN: CPT

## 2021-10-21 PROCEDURE — 93000 ELECTROCARDIOGRAM COMPLETE: CPT

## 2021-10-21 NOTE — ADDENDUM
Forms placed at PCP desk for completion     Jeanette Pena,    [FreeTextEntry1] : This note was documented by Alexandre Tinoco on 1/04/17 acting as scribe for Mundo Barnett M.D..\par \par I, Mundo Barnett M.D, certify that all of the documentation, medical decision making, and assessment within this note are true and correct.

## 2021-10-21 NOTE — HISTORY OF PRESENT ILLNESS
[FreeTextEntry1] : The patient has a history of mild mitral insufficiency, hypertension and hypercholesterolemia PAF asymptotic using flecainide  dx 3 yearsa a go. CHADVASC 1. ESTEFANIA with a oral appliance.  Pt had back pain on eliqus and xarlto and lovenox. \par \par Pt s/p AF ablation 1/20. patient felt some palpitations lasting a second or 2 in the last one to 2 weeks.\par \par a rhythm strip from his iwatch watch showed PAF episode - first episode of AF. Patient took flecainide and went to ER, AF stopped in ER\par \par Patient flecainide was increased and is doing well. No palpation. Patient recently has been diagnosed with diabetes. Therefore his CHADVASC score has increased to 2.\par \par EKG SR 61 bpm no PVC or APC\par event monitor APC and PVC

## 2021-10-21 NOTE — ASSESSMENT
[FreeTextEntry1] : This is a 56-year-old patient, the patient Dr. Lawson, who desires to continue his medical care since Dr. Lawson retired. The patient denies chest pain, SOB, presyncope, palpitations, dizziness or syncope. He is complaint with his cardiac medications. \par \par AF - no signs of recurrence; Patient complains of occasional skip beats which most likely are due to APCs and PVCs. \par - Patient would like to come off Flicka night. We will start decreasing the dosing of flecainide  night next visit. Patient wants to take hold of his diabetes first.\par - I discussed with patient the fact the patient now has score of two. He does not want to take anticoagulation yet. However I did offer patient loop recorder implanted for monitoring for atrial fibrillation.\par – Next visit we will decrease flecainide  and offer patient loop recorder.\par \par \par \par \par \par

## 2021-10-27 ENCOUNTER — APPOINTMENT (OUTPATIENT)
Dept: ENDOCRINOLOGY | Facility: CLINIC | Age: 56
End: 2021-10-27
Payer: COMMERCIAL

## 2021-10-27 VITALS
HEIGHT: 72 IN | TEMPERATURE: 97.1 F | DIASTOLIC BLOOD PRESSURE: 82 MMHG | HEART RATE: 78 BPM | BODY MASS INDEX: 29.8 KG/M2 | OXYGEN SATURATION: 98 % | WEIGHT: 220 LBS | SYSTOLIC BLOOD PRESSURE: 130 MMHG

## 2021-10-27 DIAGNOSIS — Z86.79 PERSONAL HISTORY OF OTHER DISEASES OF THE CIRCULATORY SYSTEM: ICD-10-CM

## 2021-10-27 PROCEDURE — 99204 OFFICE O/P NEW MOD 45 MIN: CPT

## 2021-10-27 NOTE — ASSESSMENT
[Diabetes Foot Care] : diabetes foot care [Long Term Vascular Complications] : long term vascular complications of diabetes [Carbohydrate Consistent Diet] : carbohydrate consistent diet [Importance of Diet and Exercise] : importance of diet and exercise to improve glycemic control, achieve weight loss and improve cardiovascular health [Exercise/Effect on Glucose] : exercise/effect on glucose [Hypoglycemia Management] : hypoglycemia management [Self Monitoring of Blood Glucose] : self monitoring of blood glucose [Retinopathy Screening] : Patient was referred to ophthalmology for retinopathy screening [Weight Loss] : weight loss [FreeTextEntry1] : 56 year old male with HTN, DL and Afib who present for evaluation on new onset diabetes \par \par # new onset diabetes \par - 9/2021 HBA1c 5.4% and end of September his FS became in 500 range . had acute illness in between but unclear etiology , no FH of diabetes, drinks occasionally but not every day.\par - started in metformin 1000 mg BID and glipizide 10 mg daily with improvement in FS no steroid use \par - will check for antibodies given acute onset (? pancreatic pathology ?) \par - continue same for now, advised to cut down glipizide if hypo occur and keep only metformin \par - reviewed diet and targets FS\par - refer to DM educator \par - refer to opthalmology and podiatry

## 2021-10-27 NOTE — PHYSICAL EXAM
[Alert] : alert [Obese] : obese [No Acute Distress] : no acute distress [No Proptosis] : no proptosis [No Lid Lag] : no lid lag [Thyroid Not Enlarged] : the thyroid was not enlarged [No Thyroid Nodules] : no palpable thyroid nodules [No Respiratory Distress] : no respiratory distress [No Accessory Muscle Use] : no accessory muscle use [Clear to Auscultation] : lungs were clear to auscultation bilaterally [Regular Rhythm] : with a regular rhythm [No Edema] : no peripheral edema [Not Tender] : non-tender [Not Distended] : not distended [Soft] : abdomen soft [No Stigmata of Cushings Syndrome] : no stigmata of Cushings Syndrome [Abdominal Striae] : no abdominal striae [Acanthosis Nigricans] : acanthosis nigricans present [No Tremors] : no tremors [Oriented x3] : oriented to person, place, and time

## 2021-10-27 NOTE — REVIEW OF SYSTEMS
[Fatigue] : fatigue [Recent Weight Gain (___ Lbs)] : no recent weight gain [Recent Weight Loss (___ Lbs)] : recent weight loss: [unfilled] lbs [Blurred Vision] : blurred vision [Dysphagia] : no dysphagia [Dysphonia] : no dysphonia [Chest Pain] : no chest pain [Palpitations] : no palpitations [Fast Heart Rate] : heart rate is not fast [Wheezing] : no wheezing [Nausea] : no nausea [Constipation] : no constipation [Vomiting] : no vomiting [Diarrhea] : no diarrhea [Polyuria] : polyuria [Acne] : no acne [Hirsutism] : no hirsutism [Headaches] : no headaches [Dizziness] : no dizziness [Tremors] : no tremors [Pain/Numbness of Digits] : pain/numbness of digits [Cold Intolerance] : no cold intolerance [Heat Intolerance] : no heat intolerance [Easy Bruising] : no tendency for easy bruising

## 2021-10-27 NOTE — DATA REVIEWED
[FreeTextEntry1] : 9/8/21: A1c 5.4%  glucose 125 crea 1.17 GFR 68 AST 35 ALT 70 TSH 1.02 ft4 1.3 \par 9/30/21: glucose 388 crea 1.06 GFR 89 AST 19 ALT 30  hb 14.9 insulin 3 TSH 0.79  amylase 34  lipase 167

## 2021-10-27 NOTE — HISTORY OF PRESENT ILLNESS
[FreeTextEntry1] : 56 year old male with HTN, DL and Afib who present for evaluation on new onset diabetes \par \par Early September patient has routine blood work done by PCP and showed HBA1c 5.5 and glucose ( non fasting 125 ) , had an acute illness with fever and ? URI for 2 week , tested for COVID was negative but had possible tick bite and was treated with antibiotics. \par after recovery he started to notice increase thirst, loosing weight so labs where done end of September, showed FS on 500, started on metformin 1000 mg BID with no improvement in FS so glipizide XR 10 mg was added . \par now FS in 100 range had 1 episode of extreme hunger and felt shaky middle of the day . \par   [Finger Stick] : Finger Stick: Yes [Hypoglycemia] : Patient is hypoglycemic.

## 2021-12-10 ENCOUNTER — APPOINTMENT (OUTPATIENT)
Dept: CARDIOLOGY | Facility: CLINIC | Age: 56
End: 2021-12-10

## 2022-01-19 ENCOUNTER — RX RENEWAL (OUTPATIENT)
Age: 57
End: 2022-01-19

## 2022-01-27 ENCOUNTER — APPOINTMENT (OUTPATIENT)
Dept: ENDOCRINOLOGY | Facility: CLINIC | Age: 57
End: 2022-01-27
Payer: COMMERCIAL

## 2022-01-27 VITALS
DIASTOLIC BLOOD PRESSURE: 82 MMHG | OXYGEN SATURATION: 98 % | WEIGHT: 213 LBS | HEIGHT: 72 IN | TEMPERATURE: 97 F | SYSTOLIC BLOOD PRESSURE: 122 MMHG | BODY MASS INDEX: 28.85 KG/M2 | HEART RATE: 76 BPM

## 2022-01-27 PROCEDURE — 99213 OFFICE O/P EST LOW 20 MIN: CPT

## 2022-01-27 RX ORDER — GLIPIZIDE 10 MG/1
10 TABLET ORAL
Refills: 0 | Status: DISCONTINUED | COMMUNITY
End: 2022-01-27

## 2022-01-27 NOTE — DATA REVIEWED
[FreeTextEntry1] : 9/8/21: A1c 5.4%  glucose 125 crea 1.17 GFR 68 AST 35 ALT 70 TSH 1.02 ft4 1.3 \par 9/30/21: glucose 388 crea 1.06 GFR 89 AST 19 ALT 30  hb 14.9 insulin 3 TSH 0.79  amylase 34  lipase 167\par 1/2022:  islet cell antibodes negative anti donna negative, A1c 5.8% GFR 97 normal tsh

## 2022-01-27 NOTE — ASSESSMENT
[Diabetes Foot Care] : diabetes foot care [Long Term Vascular Complications] : long term vascular complications of diabetes [Carbohydrate Consistent Diet] : carbohydrate consistent diet [Importance of Diet and Exercise] : importance of diet and exercise to improve glycemic control, achieve weight loss and improve cardiovascular health [Exercise/Effect on Glucose] : exercise/effect on glucose [Hypoglycemia Management] : hypoglycemia management [Self Monitoring of Blood Glucose] : self monitoring of blood glucose [Retinopathy Screening] : Patient was referred to ophthalmology for retinopathy screening [Weight Loss] : weight loss [FreeTextEntry1] : 56 year old male with HTN, DL and Afib who present for evaluation on new onset diabetes \par \par # new onset diabetes \par - 9/2021 HBA1c 5.4% and end of September his FS became in 500 range . had acute illness in between but unclear etiology , no FH of diabetes, drinks occasionally but not every day.\par - antibodies checked negative \par - started in metformin 1000 mg BID and glipizide 10 mg daily with improvement in FS and now HBa1c 5.8% \par \par - stop glipizide given hypoglycemia symptoms\par - continue metformin 1000 mg BID \par check lipid panel \par - f/u opthalmology and podiatry

## 2022-01-27 NOTE — PHYSICAL EXAM
[Alert] : alert [Obese] : obese [No Acute Distress] : no acute distress [No Proptosis] : no proptosis [No Lid Lag] : no lid lag [Thyroid Not Enlarged] : the thyroid was not enlarged [No Thyroid Nodules] : no palpable thyroid nodules [No Respiratory Distress] : no respiratory distress [No Accessory Muscle Use] : no accessory muscle use [Clear to Auscultation] : lungs were clear to auscultation bilaterally [Regular Rhythm] : with a regular rhythm [No Edema] : no peripheral edema [Not Tender] : non-tender [Not Distended] : not distended [Soft] : abdomen soft [No Stigmata of Cushings Syndrome] : no stigmata of Cushings Syndrome [Acanthosis Nigricans] : acanthosis nigricans present [No Tremors] : no tremors [Oriented x3] : oriented to person, place, and time [Abdominal Striae] : no abdominal striae

## 2022-01-27 NOTE — REVIEW OF SYSTEMS
[Fatigue] : no fatigue [Recent Weight Gain (___ Lbs)] : no recent weight gain [Recent Weight Loss (___ Lbs)] : no recent weight loss [Blurred Vision] : no blurred vision [Dysphagia] : no dysphagia [Dysphonia] : no dysphonia [Chest Pain] : no chest pain [Palpitations] : no palpitations [Fast Heart Rate] : heart rate is not fast [Wheezing] : no wheezing [Nausea] : no nausea [Constipation] : no constipation [Vomiting] : no vomiting [Diarrhea] : no diarrhea [Polyuria] : no polyuria [Acne] : no acne [Hirsutism] : no hirsutism [Headaches] : no headaches [Dizziness] : no dizziness [Tremors] : no tremors [Pain/Numbness of Digits] : no pain/numbness of digits [Cold Intolerance] : no cold intolerance [Heat Intolerance] : no heat intolerance [Easy Bruising] : no tendency for easy bruising

## 2022-01-27 NOTE — HISTORY OF PRESENT ILLNESS
[Finger Stick] : Finger Stick: Yes [Hypoglycemia] : Patient is hypoglycemic. [FreeTextEntry1] : 56 year old male with HTN, DL and Afib who present for [facility] type 2 DM \par \par #Early September patient has routine blood work done by PCP and showed HBA1c 5.5 and glucose ( non fasting 125 ) , had an acute illness with fever and ? URI for 2 weeks , tested for COVID was negative but had possible tick bite and was treated with antibiotics. \par after recovery he started to notice increase thirst, loosing weight so labs where done end of September, showed FS on 500, started on metformin 1000 mg BID with no improvement in FS so glipizide XR 10 mg was added . \par 10/2021: FS in 100 range had 1 episode of extreme hunger and felt shaky middle of the day . \par  \par \par 1/2022 follow up : patient is here for follow up feeling better , no more blurry vision or numbness in extremities. \par his FS in am are within target getting hypoglycemia symptoms around lunch.\par he did  not see yet opthalmology

## 2022-01-31 ENCOUNTER — APPOINTMENT (OUTPATIENT)
Dept: CARDIOLOGY | Facility: CLINIC | Age: 57
End: 2022-01-31
Payer: COMMERCIAL

## 2022-01-31 ENCOUNTER — RESULT CHARGE (OUTPATIENT)
Age: 57
End: 2022-01-31

## 2022-01-31 VITALS
DIASTOLIC BLOOD PRESSURE: 88 MMHG | HEIGHT: 72 IN | HEART RATE: 61 BPM | SYSTOLIC BLOOD PRESSURE: 130 MMHG | BODY MASS INDEX: 29.53 KG/M2 | WEIGHT: 218 LBS | TEMPERATURE: 97.4 F

## 2022-01-31 PROCEDURE — 93000 ELECTROCARDIOGRAM COMPLETE: CPT

## 2022-01-31 PROCEDURE — 99213 OFFICE O/P EST LOW 20 MIN: CPT

## 2022-01-31 NOTE — ASSESSMENT
[FreeTextEntry1] : This is a 56-year-old patient, the patient Dr. Lawson, who desires to continue his medical care since Dr. Lawson retired. The patient denies chest pain, SOB, presyncope, palpitations, dizziness or syncope. He is complaint with his cardiac medications. \par \par AF - no signs of recurrence; Patient complains of occasional skip beats which most likely are due to APCs and PVCs. \par - We will try decreasing the dosing of flecainide  to 100 mg. Patient wants to take hold of his diabetes first.\par - I discussed with patient the fact the patient now has score of two. He does not want to take anticoagulation yet. \par – f/u with EP.\par \par \par \par \par \par

## 2022-01-31 NOTE — HISTORY OF PRESENT ILLNESS
[FreeTextEntry1] : The patient has a history of mild mitral insufficiency, hypertension and hypercholesterolemia PAF asymptotic using flecainide  dx 3 years a go. ESTEFANIA with a oral appliance.  Pt had back pain on eliqus and xarlto and lovenox. \par Now diagnosed with DM.\par \par Pt s/p AF ablation 1/20. patient felt some palpitations lasting a second or 2 in the last one to 2 weeks.\par \par a rhythm strip from his iwatch watch showed PAF episode - first episode of AF. Patient took flecainide and went to ER, AF stopped in ER\par \par Patient flecainide was increased and is doing well. No palpation. Patient recently has been diagnosed with diabetes. Therefore his CHADVASC score has increased to 2.\par \par EKG SR 61 bpm no PVC or APC\par event monitor APC and PVC

## 2022-02-02 ENCOUNTER — APPOINTMENT (OUTPATIENT)
Dept: ENDOCRINOLOGY | Facility: CLINIC | Age: 57
End: 2022-02-02

## 2022-02-02 ENCOUNTER — RX RENEWAL (OUTPATIENT)
Age: 57
End: 2022-02-02

## 2022-05-22 NOTE — ED PROVIDER NOTE - NS ED NOTE AC HIGH RISK COUNTRIES
Has had improvement in A1c to normal range with weight loss of about 10-15 pounds  Reviewed data to support prevention of progression to DM with weight loss 5-7%  She will work on lifestyle changes   No

## 2022-06-17 ENCOUNTER — APPOINTMENT (OUTPATIENT)
Dept: ENDOCRINOLOGY | Facility: CLINIC | Age: 57
End: 2022-06-17
Payer: COMMERCIAL

## 2022-06-17 VITALS
HEART RATE: 64 BPM | DIASTOLIC BLOOD PRESSURE: 100 MMHG | WEIGHT: 218 LBS | SYSTOLIC BLOOD PRESSURE: 150 MMHG | BODY MASS INDEX: 27.1 KG/M2 | HEIGHT: 75 IN

## 2022-06-17 PROCEDURE — 99213 OFFICE O/P EST LOW 20 MIN: CPT

## 2022-06-17 NOTE — ASSESSMENT
[Diabetes Foot Care] : diabetes foot care [Long Term Vascular Complications] : long term vascular complications of diabetes [Carbohydrate Consistent Diet] : carbohydrate consistent diet [Importance of Diet and Exercise] : importance of diet and exercise to improve glycemic control, achieve weight loss and improve cardiovascular health [Exercise/Effect on Glucose] : exercise/effect on glucose [Hypoglycemia Management] : hypoglycemia management [Self Monitoring of Blood Glucose] : self monitoring of blood glucose [Retinopathy Screening] : Patient was referred to ophthalmology for retinopathy screening [Weight Loss] : weight loss [FreeTextEntry1] : 57 year old male with HTN, DL and Afib who present for follow up type 2 DM \par \par # well controlled type 2 DM /HTN \par - 6/2022 HBA1c 6% \par - antibodies checked negative \par - continue metformin 500 mg 2 tablets BID tolerating it well \par - not on statin \par - on ARB/ amlodipine, metoprolol  BP high no albuminuria and GFR ok , managed with cardiology as he has PVC too, he will monitor at home and increase amlodipine to 5 mg daily , if BG higher might benefit from SGlt 2 inh in the future \par - no more numbness in feet and saw podiatry with no concerns \par - had eye exam done but not dilated, no concern , he will see opthalmology for dilated exam

## 2022-06-17 NOTE — PHYSICAL EXAM
[Alert] : alert [Obese] : obese [No Acute Distress] : no acute distress [No Proptosis] : no proptosis [No Lid Lag] : no lid lag [Thyroid Not Enlarged] : the thyroid was not enlarged [No Thyroid Nodules] : no palpable thyroid nodules [No Respiratory Distress] : no respiratory distress [No Accessory Muscle Use] : no accessory muscle use [Clear to Auscultation] : lungs were clear to auscultation bilaterally [Regular Rhythm] : with a regular rhythm [No Edema] : no peripheral edema [No Stigmata of Cushings Syndrome] : no stigmata of Cushings Syndrome [Acanthosis Nigricans] : acanthosis nigricans present [Oriented x3] : oriented to person, place, and time [Abdominal Striae] : no abdominal striae [de-identified] : skipped beats

## 2022-06-17 NOTE — HISTORY OF PRESENT ILLNESS
[Finger Stick] : Finger Stick: Yes [FreeTextEntry1] : 57 year old male with HTN, DL and Afib who present for follow up type 2 DM \par \par #Early September 2021  patient has routine blood work done by PCP and showed HBA1c 5.5 and glucose ( non fasting 125 ) , had an acute illness with fever and ? URI for 2 weeks , tested for COVID was negative but had possible tick bite and was treated with antibiotics. \par after recovery he started to notice increase thirst, loosing weight so labs where done end of September, showed FS on 500, started on metformin 1000 mg BID with no improvement in FS so glipizide XR 10 mg was added . \par 10/2021: FS in 100 range had 1 episode of extreme hunger and felt shaky middle of the day . \par  \par \par 1/2022 follow up : patient is here for follow up feeling better , no more blurry vision or numbness in extremities. \par his FS in am are within target getting hypoglycemia symptoms around lunch.\par he did  not see yet opthalmology \par \par 6/2022: follow up: patient is feeling ok, has been struggling to maintain his healthy diet because of work , remain on metformin 1000 mg BID tolerating it ok \par BP mildly high  [Hypoglycemia] : Patient is not hypoglycemic.

## 2022-06-17 NOTE — DATA REVIEWED
[FreeTextEntry1] : 9/8/21: A1c 5.4%  glucose 125 crea 1.17 GFR 68 AST 35 ALT 70 TSH 1.02 ft4 1.3 \par 9/30/21: glucose 388 crea 1.06 GFR 89 AST 19 ALT 30  hb 14.9 insulin 3 TSH 0.79  amylase 34  lipase 167\par 1/2022:  islet cell antibodes negative anti donna negative, A1c 5.8% GFR 97 normal tsh \par 6/2022: A1c 6% LDL 94  TG 85  alb/crea 450 glucose 97 crea 0.91  GFR 92 TSH 1.36\par

## 2022-06-20 ENCOUNTER — APPOINTMENT (OUTPATIENT)
Dept: ENDOCRINOLOGY | Facility: CLINIC | Age: 57
End: 2022-06-20

## 2022-07-18 ENCOUNTER — RX RENEWAL (OUTPATIENT)
Age: 57
End: 2022-07-18

## 2022-07-28 ENCOUNTER — APPOINTMENT (OUTPATIENT)
Dept: CARDIOLOGY | Facility: CLINIC | Age: 57
End: 2022-07-28

## 2022-07-28 VITALS
HEART RATE: 59 BPM | DIASTOLIC BLOOD PRESSURE: 75 MMHG | WEIGHT: 210 LBS | SYSTOLIC BLOOD PRESSURE: 127 MMHG | TEMPERATURE: 97.8 F | HEIGHT: 72 IN | BODY MASS INDEX: 28.44 KG/M2

## 2022-07-28 DIAGNOSIS — I48.91 UNSPECIFIED ATRIAL FIBRILLATION: ICD-10-CM

## 2022-07-28 DIAGNOSIS — Z86.39 PERSONAL HISTORY OF OTHER ENDOCRINE, NUTRITIONAL AND METABOLIC DISEASE: ICD-10-CM

## 2022-07-28 PROCEDURE — 93000 ELECTROCARDIOGRAM COMPLETE: CPT | Mod: 59

## 2022-07-28 PROCEDURE — 99214 OFFICE O/P EST MOD 30 MIN: CPT | Mod: 25

## 2022-07-28 RX ORDER — METFORMIN HYDROCHLORIDE 500 MG/1
500 TABLET, COATED ORAL
Refills: 0 | Status: COMPLETED | COMMUNITY
End: 2022-07-28

## 2022-07-28 NOTE — PHYSICAL EXAM
[Well Developed] : well developed [Well Nourished] : well nourished [No Acute Distress] : no acute distress [Normal Conjunctiva] : normal conjunctiva [Normal Venous Pressure] : normal venous pressure [Normal S1, S2] : normal S1, S2 [No Murmur] : no murmur [Clear Lung Fields] : clear lung fields [Good Air Entry] : good air entry [No Respiratory Distress] : no respiratory distress  [Soft] : abdomen soft [Normal Gait] : normal gait [No Edema] : no edema [No Rash] : no rash [Moves all extremities] : moves all extremities [Normal Speech] : normal speech [Alert and Oriented] : alert and oriented

## 2022-07-28 NOTE — ASSESSMENT
[FreeTextEntry1] : # Paroxysmal AFib s/p ablation\par - Cont Flecainide and Metoprolol\par - Cont aspirin. Discussed option of starting Eliquis since CHADS VASc is now 2. He said he tried Eliquis and Xarelto but suffered sig back pain. Patient would like to wait to discuss this with Dr. Brunson before switching as he only prev tolerated Pradaxa. \par \par # HTN\par - BP well controlled\par - Cont Amlodipine. Currently taking two tablets daily per recs from PCP. \par - 2g Na diet enforced\par - Follow up with Dr. Brunson\par \par I have also advised the patient to go to the nearest emergency room if he experiences any chest pain, dyspnea, syncope, or has any other compelling symptoms.\par \par Follow up in 2 mo with NP/Dr. Blake.

## 2022-07-28 NOTE — HISTORY OF PRESENT ILLNESS
[FreeTextEntry1] : \par EP: Dr. Blake\par Cardiologist: Dr. 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 The patient denies any cardiovascular complaints including chest pain, dyspnea, palpitations, dizziness, lightheadedness, presyncope or syncope. Flecainide decreased to 100 by his cardiologist. \par \par Of note, pt had a severe reaction to COVID vaccine last year and has new onset diabetes.

## 2022-07-28 NOTE — CARDIOLOGY SUMMARY
[de-identified] : 7/28/2022 NSR (HR 58 bpm) [de-identified] : 7/2021 [place], 9:37, 12.2 METs, no arrhythmias

## 2022-08-08 ENCOUNTER — RESULT CHARGE (OUTPATIENT)
Age: 57
End: 2022-08-08

## 2022-08-08 ENCOUNTER — APPOINTMENT (OUTPATIENT)
Dept: CARDIOLOGY | Facility: CLINIC | Age: 57
End: 2022-08-08

## 2022-08-08 VITALS
SYSTOLIC BLOOD PRESSURE: 132 MMHG | HEART RATE: 63 BPM | DIASTOLIC BLOOD PRESSURE: 80 MMHG | BODY MASS INDEX: 28.99 KG/M2 | HEIGHT: 72 IN | WEIGHT: 214 LBS

## 2022-08-08 PROCEDURE — 93000 ELECTROCARDIOGRAM COMPLETE: CPT

## 2022-08-08 PROCEDURE — 99214 OFFICE O/P EST MOD 30 MIN: CPT | Mod: 25

## 2022-08-08 NOTE — ASSESSMENT
[FreeTextEntry1] : This is a 57 year old patient, the patient Dr. Lawson, who desires to continue his medical care since Dr. Lawson retired. The patient denies chest pain, SOB, presyncope, palpitations, dizziness or syncope. He is complaint with his cardiac medications. \par \par AF - no signs of recurrence; Patient complains of occasional skip beats which most likely are due to APCs and PVCs. \par -  decreased the dosing of flecainide  to 100 mg.\par - I discussed with patient the fact the patient now has score of two. He does not want to take anticoagulation yet. \par – f/u with EP.\par - had reaction to COVID-19 vaccine\par - off glipizide - told by endocrine he has prediabetes.\par - no palpitations\par \par Options of AC vs. c/w ASA discussed. He would like to hold off for now and monitor his HR and rhythm.\par \par \par \par \par \par

## 2022-09-30 ENCOUNTER — APPOINTMENT (OUTPATIENT)
Dept: CARDIOLOGY | Facility: CLINIC | Age: 57
End: 2022-09-30

## 2022-09-30 VITALS
DIASTOLIC BLOOD PRESSURE: 90 MMHG | HEART RATE: 56 BPM | RESPIRATION RATE: 16 BRPM | WEIGHT: 210 LBS | TEMPERATURE: 98 F | BODY MASS INDEX: 28.44 KG/M2 | SYSTOLIC BLOOD PRESSURE: 150 MMHG | HEIGHT: 72 IN

## 2022-09-30 PROCEDURE — 99214 OFFICE O/P EST MOD 30 MIN: CPT | Mod: 25

## 2022-09-30 PROCEDURE — 93000 ELECTROCARDIOGRAM COMPLETE: CPT

## 2022-09-30 RX ORDER — ASPIRIN 325 MG/1
325 TABLET, FILM COATED ORAL DAILY
Refills: 0 | Status: ACTIVE | COMMUNITY

## 2022-09-30 RX ORDER — BLOOD SUGAR DIAGNOSTIC
STRIP MISCELLANEOUS
Qty: 1 | Refills: 5 | Status: ACTIVE | COMMUNITY
Start: 2022-07-13

## 2022-09-30 NOTE — ASSESSMENT
[FreeTextEntry1] : Paroxysmal AFib s/p ablation\par - CHADSVASC of 2\par - Patient currently is asymptomatic and does not believe he has atrial fibrillation any longer and is doing very well. However his CHADSVASC score has increased to 2. Discussed in detail the risks and benefits of starting anti-coagulation, however will proceed with a loop recorder to evaluate burden at this time. If patient continues to have AF, will start anticoagulation.\par - Continue flecainide 100 mg BID\par - Continue metoprolol 25mg QD\par - Creatinine 0.8 from labs in January\par \par PVCs\par - Loop recorder will help to evaluate PVC burden, if any\par - Continue metoprolol 25mg QD at this time\par \par Hypertension\par - Well controlled\par - Continue candesartan 16mg QD\par

## 2022-09-30 NOTE — PHYSICAL EXAM
[Well Developed] : well developed [Well Nourished] : well nourished [No Acute Distress] : no acute distress [Normal Conjunctiva] : normal conjunctiva [Normal Venous Pressure] : normal venous pressure [Normal S1, S2] : normal S1, S2 [No Murmur] : no murmur [Clear Lung Fields] : clear lung fields [Good Air Entry] : good air entry [No Respiratory Distress] : no respiratory distress  [Soft] : abdomen soft [Normal Gait] : normal gait [No Edema] : no edema [No Rash] : no rash [Moves all extremities] : moves all extremities [Normal Speech] : normal speech [Alert and Oriented] : alert and oriented [No Carotid Bruit] : no carotid bruit [No Rub] : no rub [No Gallop] : no gallop [Non Tender] : non-tender [No Masses/organomegaly] : no masses/organomegaly [Normal Bowel Sounds] : normal bowel sounds [No Cyanosis] : no cyanosis [No Clubbing] : no clubbing [No Varicosities] : no varicosities [No Skin Lesions] : no skin lesions [No Focal Deficits] : no focal deficits [Normal memory] : normal memory

## 2022-09-30 NOTE — ADDENDUM
[FreeTextEntry1] : IWilliam assisted in documentation on 09/30/2022 acting as a scribe for Dr. Varun Blake.

## 2022-09-30 NOTE — CARDIOLOGY SUMMARY
[de-identified] : 7/28/2022 NSR (HR 58 bpm) [de-identified] : 7/2021 [place], 9:37, 12.2 METs, no arrhythmias

## 2022-09-30 NOTE — HISTORY OF PRESENT ILLNESS
[FreeTextEntry1] : \par EP: Dr. Blake\par Cardiologist: Dr. 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 The patient denies any cardiovascular complaints including chest pain, dyspnea, palpitations, dizziness, lightheadedness, presyncope or syncope. Flecainide decreased to 100 by his cardiologist. \par \par Patient does not feel any palpitations or shortness of breath where previously he felt his PVCs in the atrial fibrillation.\par \par EKG SR 56 BPM, no PVCs

## 2022-12-15 ENCOUNTER — APPOINTMENT (OUTPATIENT)
Dept: ENDOCRINOLOGY | Facility: CLINIC | Age: 57
End: 2022-12-15

## 2022-12-15 PROCEDURE — 99212 OFFICE O/P EST SF 10 MIN: CPT | Mod: 95

## 2022-12-15 NOTE — PHYSICAL EXAM
[Alert] : alert [Obese] : obese [No Acute Distress] : no acute distress [No Proptosis] : no proptosis [No Lid Lag] : no lid lag [No Thyroid Nodules] : no palpable thyroid nodules [No Respiratory Distress] : no respiratory distress [No Accessory Muscle Use] : no accessory muscle use [No Stigmata of Cushings Syndrome] : no stigmata of Cushings Syndrome [Acanthosis Nigricans] : acanthosis nigricans present [Oriented x3] : oriented to person, place, and time [de-identified] : visual  [de-identified] : skipped beats

## 2022-12-15 NOTE — REVIEW OF SYSTEMS
[Fatigue] : no fatigue [Recent Weight Gain (___ Lbs)] : no recent weight gain [Recent Weight Loss (___ Lbs)] : no recent weight loss [Blurred Vision] : no blurred vision [Dysphagia] : no dysphagia [Dysphonia] : no dysphonia [Chest Pain] : no chest pain [Palpitations] : no palpitations [Fast Heart Rate] : heart rate is not fast [Wheezing] : no wheezing [Nausea] : nausea [Constipation] : no constipation [Vomiting] : no vomiting [Diarrhea] : no diarrhea [Polyuria] : no polyuria [Acne] : no acne [Hirsutism] : no hirsutism [Headaches] : no headaches [Dizziness] : no dizziness [Tremors] : no tremors [Pain/Numbness of Digits] : no pain/numbness of digits [Cold Intolerance] : no cold intolerance [Heat Intolerance] : no heat intolerance [Easy Bruising] : no tendency for easy bruising

## 2022-12-15 NOTE — HISTORY OF PRESENT ILLNESS
[Finger Stick] : Finger Stick: Yes [FreeTextEntry1] : 57 year old male with HTN, DL and Afib who present for follow up type 2 DM \par \par TYpe 2 DM \par - Early September 2021  patient has routine blood work done by PCP and showed HBA1c 5.5 and glucose ( non fasting 125 ) , had an acute illness with fever and ? URI for 2 weeks , tested for COVID was negative but had possible tick bite and was treated with antibiotics. \par - after recovery he started to notice increase thirst, loosing weight so labs where done end of September, showed FS on 500, started on metformin 1000 mg BID with no improvement in FS so glipizide XR 10 mg was added . \par 10/2021: FS in 100 range had 1 episode of extreme hunger and felt shaky middle of the day . \par  \par 1/2022 follow up : patient is here for follow up feeling better , no more blurry vision or numbness in extremities. \par his FS in am are within target getting hypoglycemia symptoms around lunch.\par he did  not see yet opthalmology \par \par 6/2022: follow up: patient is feeling ok, has been struggling to maintain his healthy diet because of work , remain on metformin 1000 mg BID tolerating it ok \par BP mildly high \par \par 12/2022: Telehealth visit : patient has no new complaints, am fasting FS ranging in 120 , was not eating very healthy lately but now trying to improve . remain on metformin 1000 mg BID , noted mild nausea lately  only no bloating no diarrhea  [Hypoglycemia] : Patient is not hypoglycemic.

## 2022-12-15 NOTE — REASON FOR VISIT
[Follow - Up] : a follow-up visit [DM Type 2] : DM Type 2 [Home] : at home, [unfilled] , at the time of the visit. [Other Location: e.g. Home (Enter Location, City,State)___] : at [unfilled] [Patient] : the patient [FreeTextEntry4] : Tracie

## 2022-12-15 NOTE — DATA REVIEWED
[FreeTextEntry1] : 9/8/21: A1c 5.4%  glucose 125 crea 1.17 GFR 68 AST 35 ALT 70 TSH 1.02 ft4 1.3 \par 9/30/21: glucose 388 crea 1.06 GFR 89 AST 19 ALT 30  hb 14.9 insulin 3 TSH 0.79  amylase 34  lipase 167\par 1/2022:  islet cell antibodes negative anti donna negative, A1c 5.8% GFR 97 normal tsh \par 6/2022: A1c 6% LDL 94  TG 85  alb/crea 450 glucose 97 crea 0.91  GFR 92 TSH 1.36\par 12/022: A1c 6%  glucose 127 crea 0.77    alb/crea negative TSH 1.74 b12 334 \par \par \par

## 2022-12-15 NOTE — ASSESSMENT
[Diabetes Foot Care] : diabetes foot care [Long Term Vascular Complications] : long term vascular complications of diabetes [Carbohydrate Consistent Diet] : carbohydrate consistent diet [Importance of Diet and Exercise] : importance of diet and exercise to improve glycemic control, achieve weight loss and improve cardiovascular health [Exercise/Effect on Glucose] : exercise/effect on glucose [Hypoglycemia Management] : hypoglycemia management [Self Monitoring of Blood Glucose] : self monitoring of blood glucose [Retinopathy Screening] : Patient was referred to ophthalmology for retinopathy screening [Weight Loss] : weight loss [FreeTextEntry1] : 57 year old male with HTN, DL and Afib who present for follow up type 2 DM \par \par # well controlled type 2 DM /HTN \par - 12//2022 HBA1c 6% \par - antibodies checked negative \par - continue metformin 500 mg 2 tablets BID tolerating it well \par - not on statin \par - on ARB/ amlodipine, metoprolol  BP  no albuminuria and GFR ok , managed with cardiology as he has PVC too\par - no  numbness in feet and saw podiatry with no concerns \par - opthalmology f/u

## 2023-01-09 ENCOUNTER — RX RENEWAL (OUTPATIENT)
Age: 58
End: 2023-01-09

## 2023-01-18 ENCOUNTER — RX RENEWAL (OUTPATIENT)
Age: 58
End: 2023-01-18

## 2023-02-03 ENCOUNTER — APPOINTMENT (OUTPATIENT)
Dept: CARDIOLOGY | Facility: CLINIC | Age: 58
End: 2023-02-03

## 2023-02-27 ENCOUNTER — RESULT CHARGE (OUTPATIENT)
Age: 58
End: 2023-02-27

## 2023-02-27 ENCOUNTER — APPOINTMENT (OUTPATIENT)
Dept: CARDIOLOGY | Facility: CLINIC | Age: 58
End: 2023-02-27
Payer: COMMERCIAL

## 2023-02-27 VITALS
SYSTOLIC BLOOD PRESSURE: 152 MMHG | HEART RATE: 58 BPM | HEIGHT: 72 IN | DIASTOLIC BLOOD PRESSURE: 94 MMHG | TEMPERATURE: 97.9 F | BODY MASS INDEX: 29.53 KG/M2 | WEIGHT: 218 LBS

## 2023-02-27 DIAGNOSIS — Z86.69 PERSONAL HISTORY OF OTHER DISEASES OF THE NERVOUS SYSTEM AND SENSE ORGANS: ICD-10-CM

## 2023-02-27 DIAGNOSIS — I34.0 NONRHEUMATIC MITRAL (VALVE) INSUFFICIENCY: ICD-10-CM

## 2023-02-27 PROCEDURE — 93000 ELECTROCARDIOGRAM COMPLETE: CPT

## 2023-02-27 PROCEDURE — 99214 OFFICE O/P EST MOD 30 MIN: CPT | Mod: 25

## 2023-02-27 NOTE — ASSESSMENT
[FreeTextEntry1] : This is a 58 year old patient, the patient Dr. Lawson, who desires to continue his medical care since Dr. Lawson retired. The patient denies chest pain, SOB, presyncope, palpitations, dizziness or syncope. He is complaint with his cardiac medications. \par \par AF - no signs of recurrence; Patient complains of occasional skip beats which most likely are due to APCs and PVCs. \par -  decreased the dosing of flecainide  to 100 mg.\par - I discussed with patient the fact the patient now has score of two. He does not want to take anticoagulation yet. \par – f/u with EP - Dr. Blake.\par - f/u with endocrine for prediabetes.\par - no palpitations\par \par Options of AC vs. c/w ASA discussed. He would like to hold off for now and monitor his HR and rhythm.\par \par \par \par \par \par

## 2023-02-27 NOTE — HISTORY OF PRESENT ILLNESS
[FreeTextEntry1] : The patient has a history of mild mitral insufficiency, hypertension and hypercholesterolemia PAF asymptotic using flecainide  dx 3 years a go. ESTEFANIA with a oral appliance.  Pt had back pain on eliqus and xarlto and lovenox. \par Now diagnosed with DM. No events. Tolerating flecinide. BP is elevated.\par \par Pt s/p AF ablation 1/20. \par \par \par Patient flecainide was increased and is doing well. No palpation. Patient recently has been diagnosed with diabetes. Therefore his CHADVASC score has increased to 2.\par \par EKG SR 58 bpm no PVC or APC\par event monitor APC and PVC

## 2023-06-16 NOTE — CONSULT NOTE ADULT - CONSULT REASON
afib Alar Island Pedicle Flap Text: The defect edges were debeveled with a #15 scalpel blade.  Given the location of the defect, shape of the defect and the proximity to the alar rim an island pedicle advancement flap was deemed most appropriate.  Using a sterile surgical marker, an appropriate advancement flap was drawn incorporating the defect, outlining the appropriate donor tissue and placing the expected incisions within the nasal ala running parallel to the alar rim. The area thus outlined was incised with a #15 scalpel blade.  The skin margins were undermined minimally to an appropriate distance in all directions around the primary defect and laterally outward around the island pedicle utilizing iris scissors.  There was minimal undermining beneath the pedicle flap.

## 2023-07-05 ENCOUNTER — NON-APPOINTMENT (OUTPATIENT)
Age: 58
End: 2023-07-05

## 2023-07-05 ENCOUNTER — APPOINTMENT (OUTPATIENT)
Dept: CARDIOLOGY | Facility: CLINIC | Age: 58
End: 2023-07-05
Payer: COMMERCIAL

## 2023-07-05 VITALS
HEIGHT: 72 IN | TEMPERATURE: 96.6 F | RESPIRATION RATE: 18 BRPM | OXYGEN SATURATION: 99 % | SYSTOLIC BLOOD PRESSURE: 132 MMHG | DIASTOLIC BLOOD PRESSURE: 82 MMHG | BODY MASS INDEX: 28.71 KG/M2 | WEIGHT: 212 LBS | HEART RATE: 64 BPM

## 2023-07-05 DIAGNOSIS — E11.9 TYPE 2 DIABETES MELLITUS W/OUT COMPLICATIONS: ICD-10-CM

## 2023-07-05 DIAGNOSIS — I49.1 ATRIAL PREMATURE DEPOLARIZATION: ICD-10-CM

## 2023-07-05 DIAGNOSIS — R00.2 PALPITATIONS: ICD-10-CM

## 2023-07-05 DIAGNOSIS — I48.0 PAROXYSMAL ATRIAL FIBRILLATION: ICD-10-CM

## 2023-07-05 DIAGNOSIS — R07.89 OTHER CHEST PAIN: ICD-10-CM

## 2023-07-05 PROCEDURE — 93000 ELECTROCARDIOGRAM COMPLETE: CPT

## 2023-07-06 PROBLEM — R07.89 OTHER CHEST PAIN: Status: ACTIVE | Noted: 2023-07-06

## 2023-07-06 PROBLEM — R00.2 PALPITATION: Status: ACTIVE | Noted: 2020-05-07

## 2023-07-06 PROBLEM — E11.9 DIABETES MELLITUS, NEW ONSET: Status: ACTIVE | Noted: 2021-10-27

## 2023-07-06 PROBLEM — I48.0 PAROXYSMAL ATRIAL FIBRILLATION: Status: ACTIVE | Noted: 2022-07-28

## 2023-07-06 NOTE — HISTORY OF PRESENT ILLNESS
[FreeTextEntry1] : The patient has a history of mild mitral insufficiency, hypertension and hypercholesterolemia PAF asymptotic using flecainide  dx 3 years a go. ESTEFANIA with a oral appliance.  Pt had back pain on eliqus and xarlto and lovenox. \par Now diagnosed with DM. No events. Tolerating flecinide. BP is elevated.\par \par Pt s/p AF ablation 1/20. \par \par \par Patient flecainide was increased and is doing well. No palpation. Patient recently has been diagnosed with diabetes. Therefore his CHADVASC score has increased to 2.\par \par Reports an episode of chest pain and palpitations.\par \par EKG SR\par

## 2023-07-06 NOTE — ASSESSMENT
[FreeTextEntry1] : \par \par AF - no signs of recurrence; Patient complains of occasional skip beats which most likely are due to APCs and PVCs. Will get a 2 week event monitor to confirm.\par -  c/w flecainide  \par - I previously discussed with patient the fact the patient now has score of two. He does not want to take anticoagulation yet. \par - chest pain - schedule a stress test/echo\par – f/u with EP - Dr. Blake.\par - f/u with endocrine for prediabetes.\par - no palpitations\par \par Options of AC vs. c/w ASA discussed. He would like to hold off for now and monitor his HR and rhythm.\par \par \par \par \par \par

## 2023-07-06 NOTE — PHYSICAL EXAM
[General Appearance - Well Developed] : well developed [Normal Appearance] : normal appearance [Well Groomed] : well groomed [General Appearance - Well Nourished] : well nourished [No Deformities] : no deformities [General Appearance - In No Acute Distress] : no acute distress [Normal Conjunctiva] : the conjunctiva exhibited no abnormalities [Normal Oral Mucosa] : normal oral mucosa [Normal Oropharynx] : normal oropharynx [FreeTextEntry1] : No JVD, no bruit [Respiration, Rhythm And Depth] : normal respiratory rhythm and effort [Exaggerated Use Of Accessory Muscles For Inspiration] : no accessory muscle use [Auscultation Breath Sounds / Voice Sounds] : lungs were clear to auscultation bilaterally [Heart Rate And Rhythm] : heart rate and rhythm were normal [Heart Sounds] : normal S1 and S2 [Murmurs] : no murmurs present [Abdomen Soft] : soft [Abdomen Tenderness] : non-tender [] : no hepato-splenomegaly [Abnormal Walk] : normal gait [Abdomen Mass (___ Cm)] : no abdominal mass palpated [Skin Color & Pigmentation] : normal skin color and pigmentation [Oriented To Time, Place, And Person] : oriented to person, place, and time [Affect] : the affect was normal [Mood] : the mood was normal [No Anxiety] : not feeling anxious

## 2023-07-06 NOTE — ADDENDUM
[FreeTextEntry1] : This note was documented by Alexandre Tinoco on 1/04/17 acting as scribe for Mundo Barnett M.D..\par \par I, uMndo Barnett M.D, certify that all of the documentation, medical decision making, and assessment within this note are true and correct.

## 2023-07-13 ENCOUNTER — RX RENEWAL (OUTPATIENT)
Age: 58
End: 2023-07-13

## 2023-07-13 RX ORDER — METFORMIN HYDROCHLORIDE 500 MG/1
500 TABLET, COATED ORAL
Qty: 360 | Refills: 3 | Status: ACTIVE | COMMUNITY
Start: 2022-01-27 | End: 1900-01-01

## 2023-07-17 ENCOUNTER — RX RENEWAL (OUTPATIENT)
Age: 58
End: 2023-07-17

## 2023-07-17 RX ORDER — AMLODIPINE BESYLATE 5 MG/1
5 TABLET ORAL
Qty: 90 | Refills: 3 | Status: ACTIVE | COMMUNITY
Start: 2023-07-17 | End: 1900-01-01

## 2023-07-26 ENCOUNTER — APPOINTMENT (OUTPATIENT)
Dept: CARDIOLOGY | Facility: CLINIC | Age: 58
End: 2023-07-26

## 2023-07-27 NOTE — PATIENT PROFILE ADULT. - NS PRO REFERRAL CMGT
Name: Brittany Etienne      : 1945      MRN: [id number]  Encounter Provider: Roz Rizvi MD  Encounter Date: 2023   Encounter department: [address] [place] [address]. Acute otitis externa of left ear, unspecified type  -     ciprofloxacin-hydrocortisone (CIPRO HC OTIC) otic suspension; Administer 3 drops into the left ear 2 (two) times a day for 7 days    Pain and discharge in the left ear. Start antibiotic drops above. Follow-up in 1 week if no improvement       Subjective      Ear drainage of the left ear. Tried an ear cleaning solution at home but this did not help. Review of Systems   Constitutional: Negative for fatigue and fever. HENT: Positive for ear discharge and ear pain. Negative for sore throat. Eyes: Negative for visual disturbance. Respiratory: Negative for cough, chest tightness and shortness of breath. Cardiovascular: Negative for chest pain, palpitations and leg swelling. Gastrointestinal: Negative for abdominal pain, constipation, diarrhea and nausea. Endocrine: Negative for cold intolerance and heat intolerance. Genitourinary: Negative for flank pain. Musculoskeletal: Negative for back pain and neck pain. Skin: Negative for rash. Neurological: Negative for headaches. Psychiatric/Behavioral: Negative for behavioral problems and confusion.        Current Outpatient Medications on File Prior to Visit   Medication Sig   • amLODIPine (NORVASC) 5 mg tablet Take 1 tablet (5 mg total) by mouth daily   • aspirin (ECOTRIN LOW STRENGTH) 81 mg EC tablet Take 1 tablet (81 mg total) by mouth daily   • hydrochlorothiazide (HYDRODIURIL) 12.5 mg tablet Take 1 tablet (12.5 mg total) by mouth daily   • levothyroxine 100 mcg tablet Take 1 tablet (100 mcg total) by mouth daily   • pravastatin (PRAVACHOL) 40 mg tablet Take 1 tablet (40 mg total) by mouth daily       Objective     /80   Pulse 74   Temp 98 °F (36.7 °C)   Ht 5' 11" (1.803 m)   Wt 103 kg (227 lb 8 oz)   SpO2 98%   BMI 31.73 kg/m²     Physical Exam  Constitutional:       Appearance: Normal appearance. HENT:      Right Ear: Hearing, ear canal and external ear normal.      Left Ear: Decreased hearing noted. Drainage present. Pulmonary:      Effort: No respiratory distress. Neurological:      General: No focal deficit present. Mental Status: He is alert and oriented to person, place, and time.    Psychiatric:         Mood and Affect: Mood normal.         Behavior: Behavior normal.       Mary Carmen Miller MD None

## 2023-08-04 ENCOUNTER — NON-APPOINTMENT (OUTPATIENT)
Age: 58
End: 2023-08-04

## 2023-08-08 ENCOUNTER — APPOINTMENT (OUTPATIENT)
Dept: CARDIOLOGY | Facility: CLINIC | Age: 58
End: 2023-08-08
Payer: COMMERCIAL

## 2023-08-08 PROCEDURE — 93320 DOPPLER ECHO COMPLETE: CPT

## 2023-08-08 PROCEDURE — 93325 DOPPLER ECHO COLOR FLOW MAPG: CPT

## 2023-08-08 PROCEDURE — 93351 STRESS TTE COMPLETE: CPT

## 2023-08-28 ENCOUNTER — APPOINTMENT (OUTPATIENT)
Dept: CARDIOLOGY | Facility: CLINIC | Age: 58
End: 2023-08-28

## 2023-09-05 RX ORDER — METOPROLOL SUCCINATE 25 MG/1
25 TABLET, EXTENDED RELEASE ORAL DAILY
Qty: 90 | Refills: 3 | Status: ACTIVE | COMMUNITY
Start: 2021-07-09 | End: 1900-01-01

## 2024-01-08 ENCOUNTER — RX RENEWAL (OUTPATIENT)
Age: 59
End: 2024-01-08

## 2024-01-09 RX ORDER — FLECAINIDE ACETATE 100 MG/1
100 TABLET ORAL
Qty: 180 | Refills: 2 | Status: ACTIVE | COMMUNITY
Start: 2021-04-19 | End: 1900-01-01

## 2024-02-05 ENCOUNTER — RX RENEWAL (OUTPATIENT)
Age: 59
End: 2024-02-05

## 2024-02-05 RX ORDER — CANDESARTAN CILEXETIL 32 MG/1
32 TABLET ORAL DAILY
Qty: 90 | Refills: 3 | Status: ACTIVE | COMMUNITY
Start: 2022-01-19 | End: 1900-01-01

## 2024-03-04 ENCOUNTER — APPOINTMENT (OUTPATIENT)
Dept: CARDIOLOGY | Facility: CLINIC | Age: 59
End: 2024-03-04
Payer: COMMERCIAL

## 2024-03-04 VITALS
SYSTOLIC BLOOD PRESSURE: 134 MMHG | BODY MASS INDEX: 28.58 KG/M2 | HEART RATE: 64 BPM | WEIGHT: 211 LBS | HEIGHT: 72 IN | DIASTOLIC BLOOD PRESSURE: 86 MMHG

## 2024-03-04 PROCEDURE — 99214 OFFICE O/P EST MOD 30 MIN: CPT | Mod: 25

## 2024-03-04 PROCEDURE — 93000 ELECTROCARDIOGRAM COMPLETE: CPT

## 2024-03-04 NOTE — HISTORY OF PRESENT ILLNESS
[FreeTextEntry1] : Pt is a 60 y/o male with PMH of mild mitral insufficiency, hypertension and hypercholesterolemia ,  DM. PAF asymptotic using flecainide  dx 3 years a go. ESTEFANIA with a oral appliance.  Pt had back pain on eliqus and xarlto and lovenox. . Tolerating flecinide.  Pt s/p AF ablation 1/20. B/p at home in [place]  Patient flecainide was increased and is doing well.  As per pt he  has been feeling good until yesterday.  He developed  brief episode of palpitations, that resolved quickly. Patient recently has been diagnosed with diabetes. Therefore his CHADVASC score has increased to 2.   Pt denies chest pain, no SOB.  As per pt he drinks energy beverage occasionally.  EKG SR 01/09/24 Chol 172 TRIG 92 LDL 96  HgbA1C 6.4 08/08/23 Exercise Stress Test EF 67% Grade I diastolic dysfunction  No Ischemia.

## 2024-03-04 NOTE — ASSESSMENT
[FreeTextEntry1] :  AF - no signs of recurrence; Patient complains of occasional skip beats which most likely are due to APCs and PVCs. No AF on 2 week event monitor. -  c/w flecainide   - I previously discussed with patient the fact the patient now has score of two. He does not want to take anticoagulation yet.  - normal stress test/echo - f/u with EP - Dr. Blake. - f/u with endocrine for prediabetes. - no palpitations  Options of AC vs. c/w ASA discussed. He would like to hold off for now and monitor his HR and rhythm.       None

## 2024-03-04 NOTE — PHYSICAL EXAM
[General Appearance - Well Developed] : well developed [Normal Appearance] : normal appearance [General Appearance - Well Nourished] : well nourished [Well Groomed] : well groomed [No Deformities] : no deformities [General Appearance - In No Acute Distress] : no acute distress [Normal Conjunctiva] : the conjunctiva exhibited no abnormalities [Normal Oral Mucosa] : normal oral mucosa [Normal Oropharynx] : normal oropharynx [Respiration, Rhythm And Depth] : normal respiratory rhythm and effort [Exaggerated Use Of Accessory Muscles For Inspiration] : no accessory muscle use [Auscultation Breath Sounds / Voice Sounds] : lungs were clear to auscultation bilaterally [Heart Rate And Rhythm] : heart rate and rhythm were normal [Heart Sounds] : normal S1 and S2 [Murmurs] : no murmurs present [Abdomen Soft] : soft [Abdomen Tenderness] : non-tender [Abdomen Mass (___ Cm)] : no abdominal mass palpated [] : no hepato-splenomegaly [Abnormal Walk] : normal gait [Skin Color & Pigmentation] : normal skin color and pigmentation [Oriented To Time, Place, And Person] : oriented to person, place, and time [Affect] : the affect was normal [Mood] : the mood was normal [No Anxiety] : not feeling anxious [FreeTextEntry1] : No JVD, no bruit

## 2024-03-22 NOTE — ED PROVIDER NOTE - DATE/TIME 4
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  Residual bicuspid aortic stenosis, now severe range and symptomatic.  Surgical Procedure Requesting (NO abbreviations): AVR, Ross procedure if able  Requested Timeframe for surgery: nonurgent, within 6 months per patient's schedule and preferences  Other pertinent diagnosis/important info: no other major medical issues  ACHD patients, is there documented consent to talk with parents: yes    Please route (using the Disp & CC Chart tab) to   [facility] [address] [place], [place] [address] [[id number]]         23-Apr-2018 12:11 1

## 2024-03-28 NOTE — PATIENT PROFILE ADULT. - CAREGIVER
3/28/2024    STANDARD FM-100+ffERG+mfERG REPORT    Testing Date:  3/28/2024    RE: Danis Marroquin  MRN: [id number]  : 1962                 CLINICAL HISTORY: Returns for FM-100+ffERG+mfERG.  Last eye exam with Dr. Sarabia 24:  +FHx OMD.  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  No interval changes.  Scheduled to return to Dr. Sarabia ; will await results.    Visual Acuity Right Eye : 20/20-2      W/gls, +1.25 + 0.50x007    Visual Acuity Left Eye : 20/20      W/gls, +0.75 + 1.25x028    IMPRESSION FM-100: PROBABLY NORMAL both eyes   STANDARD FM-100 REPORT:     DATA FOR FM-100 Right  Eye Left Eye   Total Error Score* 166, probably normal 128, probably normal   Color Discrimination+ Probably normal Probably normal                *Automatic Evaluation - Classical Method - Individual trays are scored independently.          Martha GOLD, Candace PARSONS.  [facility] 100 hue test norms of normal observers           or each year of age 5-22 and for decades 30-70.  Br J Ophthalmol. 8:[id number] (2002)          +Automatic Evaluation according to Sania Acevedo M.D., Ph.D.           =Automatic Evaluation - Moment of Inertia Method.          FABRICE Macedo. and Walter PKAT. A quantitative scoring technique for panel tests of color vision.            Investigative Ophthalmology and Visual Science, 1988, 29:50-63.    MULTIFOCAL ERG REPORT:  Abnormal central amplitude responses in both eyes   Tech notes: mfERG (9min 60 Hz) discussed and preformed with Veris system and 0.5%CBM.  Equally well dilated 9mm. Used large adult ctl w loose fit.Taped ctl electrode upright BE. Visualized default fixation cross BE.  Nice breathing/blinking. Slight head shaking during testing. Toggled between fundus and external camera and observed good fixation.      DIAGNOSTIC FINDINGS:  This is a reliable and well performed multifocal electroretinogram both eyes.  A 103-hexagon stimulus pattern multifocal ERG was done in both eyes.  The multifocal ERG was abnormal in both eyes using the Veris system.   In particular, in both eyes the waveform tracings showed normal morphology and good signal-to-noise ratio.    The amplitude plot showed mild decreased  foveal peak.     The multifocal electroretinogram shows mild depression of the central waveforms of both eyes    The Implicit times were normal       STANDARD ffERG REPORT  IMPRESSION ffERG: likely Normal electroretinogram (ERG)                               ALL AVERAGED                   Data for Full-Field ERG  Right Eye  Left Eye   DARK-ADAPTED Patient Normal Patient   0.01 ERG (remigio) b-wave amplitude ( v) 174 69.6 to 348.2 182   0.01 ERG (remigio) b-wave implicit time (ms) 99.8 78.2 to 117.2 89.3         3.0 ERG (combined) a-wave amplitude ( v) -167 -259.4 to -98 -176   3.0 ERG (combined) a-wave implicit time (ms) 17.8 11.5 to 20.3 16.6   3.0 ERG (combined) b-wave amplitude ( v) 301 159.2 to 421.6 303   3.0 ERG (combined) b-wave implicit time (ms) 51.6 41.2 to 57.2 56.6         10.0 ERG (brighter combined)a-wave amplitude ( v) -215 -291 to -110 -208   10.0 ERG (brighter combined)a-wave implicit time (ms) 15 9.9 to 15.1 13.3   10.0 ERG (brighter combined)b-wave amplitude ( v) 318 191.5 to 403.1 307   10.0 ERG (brighter combined)b-wave implicit time (ms) 55.5 39.1 to 55.3 57.7         3.0 Oscillatory Potentials  present    LIGHT-ADAPTED       3.0 Flicker (30Hz) amplitude ( v) 70 56.4 to 151.2 82   3.0 Flicker (30Hz) implicit time (ms) 27.2 21.8 to 31.6 26.6         3.0 ERG (cone) a-wave amplitude ( v) -31 -45.1 to -13.7 -27   3.0 ERG (cone) a-wave implicit time (ms) 16.1 11.4 to 16.8 15   3.0 ERG (cone) b-wave amplitude ( v) 105 62.4 to 174.2 98   3.0 ERG (cone) b-wave implicit time (ms) 30 26.8 to 34.2 29.4   * = manipulated  cursors  parentheses = cursors at selected peaks  ---- = residual to non-measurable  xxxx = not tested      Tech notes: ffERG discussed and performed with E3 system.  Instructed on importance of maintaining fixation and relaxation for optimum recordings.  FM-100 done just prior, both eyes undilated.  Equally well-dilated ~9mm.  Tolerated dtl nicely; long lashes upper and lower.  Equal and adequate eye opening w/no effort needed to clear dilated pupils.  Impedances low and comparable throughout.  No difficulty w/blinking.  Specifically, no eyelid flutter to bright flashes DA+LA and only slight droop noted with flicker.  Never obscuring dilated pupil or with complete closure.    ffERG INTERPRETATION:     The electroretinogram was performed according to ISCEV standards using We[employer] E3 system and DTL fiber recording electrodes. The patient tolerated the testing well. The waveforms are fairly reproducible and well formed. The responses in between both eyes were similar.  The normative values provided above represent the 95% confidence limits for a normal individual the age of the patient. The patient s responses are averaged.  In scotopic conditions, the remigio specific responses were normal in both eyes.  The maximal response, a combined remigio and cone responses were essentially normal and the implicit time was normal in both eyes. The implicit time was delayed for the b-wave brighter combined responses.  In light adapted conditions, the 30-Hz flicker response has a normal amplitude and normal implicit time in both eyes. The single photopic flash response are normal.    Conclusion:   This represents a likely normal electroretinogram. There is no evidence of significant remigio or cone photoreceptor abnormalities in either eye. There is delayed on the implicit time in the b-wave brighter combined response of unclear significance. This is a nonspecific finding. Certainly, there is no significant loss of remigio or cone photoreceptors.    Given that the mfERG showed  depression of the central waveforms of both eyes, this is most consistent with mild maculopathy   Clinical correlation is recommended.       I would recommend a repeated electroretinogram in a couple of years if there continues to be concern regarding a possible retinal dystrophy.     Thank you for the opportunity to provide electrophysiologic services for this patient.  Please do not hesitate to call if there should be any questions regarding these results.    Christine Sarabia MD  Professor of ophthalmology  Retina Service   Department of Ophthalmology and Visual Neurosciences   [facility]  Phone: [phone]   Fax: [phone]                    Yes

## 2024-04-15 ENCOUNTER — APPOINTMENT (OUTPATIENT)
Dept: ENDOCRINOLOGY | Facility: CLINIC | Age: 59
End: 2024-04-15
Payer: COMMERCIAL

## 2024-04-15 VITALS
HEART RATE: 68 BPM | HEIGHT: 72 IN | OXYGEN SATURATION: 98 % | SYSTOLIC BLOOD PRESSURE: 130 MMHG | BODY MASS INDEX: 28.71 KG/M2 | DIASTOLIC BLOOD PRESSURE: 80 MMHG | WEIGHT: 212 LBS

## 2024-04-15 DIAGNOSIS — E11.9 TYPE 2 DIABETES MELLITUS W/OUT COMPLICATIONS: ICD-10-CM

## 2024-04-15 DIAGNOSIS — I10 ESSENTIAL (PRIMARY) HYPERTENSION: ICD-10-CM

## 2024-04-15 PROCEDURE — 99214 OFFICE O/P EST MOD 30 MIN: CPT

## 2024-04-15 RX ORDER — FLASH GLUCOSE SCANNING READER
EACH MISCELLANEOUS
Qty: 1 | Refills: 0 | Status: DISCONTINUED | COMMUNITY
Start: 2021-10-27 | End: 2024-04-15

## 2024-04-15 RX ORDER — BLOOD-GLUCOSE SENSOR
EACH MISCELLANEOUS
Qty: 3 | Refills: 4 | Status: ACTIVE | COMMUNITY
Start: 2024-04-15 | End: 1900-01-01

## 2024-04-15 RX ORDER — FLASH GLUCOSE SENSOR
KIT MISCELLANEOUS
Qty: 2 | Refills: 5 | Status: DISCONTINUED | COMMUNITY
Start: 2021-10-27 | End: 2024-04-15

## 2024-04-15 NOTE — ASSESSMENT
[Diabetes Foot Care] : diabetes foot care [Long Term Vascular Complications] : long term vascular complications of diabetes [Carbohydrate Consistent Diet] : carbohydrate consistent diet [Importance of Diet and Exercise] : importance of diet and exercise to improve glycemic control, achieve weight loss and improve cardiovascular health [Exercise/Effect on Glucose] : exercise/effect on glucose [Hypoglycemia Management] : hypoglycemia management [Self Monitoring of Blood Glucose] : self monitoring of blood glucose [Retinopathy Screening] : Patient was referred to ophthalmology for retinopathy screening [Weight Loss] : weight loss [FreeTextEntry1] : 57 year old male with HTN, DL and Afib who present for follow up type 2 DM   # well controlled type 2 DM /HTN  - 4/2024: A1c 6.8% he lower metformin to 1000 mg OD because of loose stools but now back on BID , also attributed to bad diet recently  - will send dexcom , will benefit from tracking glucose  - refer to DM educator , given recs for diet through ADA website  - antibodies checked negative  - continue metformin 500 mg 2 tablets BID  - not on statin , will check lipid panel discussed use , he will also follow up with cardiology  - on ARB/ amlodipine, metoprolol  BP  no albuminuria and GFR ok , managed with cardiology  - no  numbness in feet and saw podiatry with no concerns  - ophthalmology f/u due

## 2024-04-15 NOTE — PHYSICAL EXAM
[Alert] : alert [Obese] : obese [No Acute Distress] : no acute distress [No Proptosis] : no proptosis [No Lid Lag] : no lid lag [No Thyroid Nodules] : no palpable thyroid nodules [No Respiratory Distress] : no respiratory distress [No Accessory Muscle Use] : no accessory muscle use [No Stigmata of Cushings Syndrome] : no stigmata of Cushings Syndrome [Acanthosis Nigricans] : acanthosis nigricans present [Oriented x3] : oriented to person, place, and time [de-identified] : visual  [de-identified] : skipped beats

## 2024-04-15 NOTE — REVIEW OF SYSTEMS
[Nausea] : nausea [Fatigue] : no fatigue [Recent Weight Gain (___ Lbs)] : no recent weight gain [Recent Weight Loss (___ Lbs)] : no recent weight loss [Blurred Vision] : no blurred vision [Dysphagia] : no dysphagia [Dysphonia] : no dysphonia [Chest Pain] : no chest pain [Palpitations] : no palpitations [Fast Heart Rate] : heart rate is not fast [Wheezing] : no wheezing [Constipation] : no constipation [Vomiting] : no vomiting [Diarrhea] : no diarrhea [Polyuria] : no polyuria [Acne] : no acne [Hirsutism] : no hirsutism [Headaches] : no headaches [Dizziness] : no dizziness [Tremors] : no tremors [Pain/Numbness of Digits] : no pain/numbness of digits [Cold Intolerance] : no cold intolerance [Heat Intolerance] : no heat intolerance [Easy Bruising] : no tendency for easy bruising

## 2024-04-15 NOTE — HISTORY OF PRESENT ILLNESS
[Finger Stick] : Finger Stick: Yes [FreeTextEntry1] : 59 year old male with HTN, DL and Afib who present for follow up type 2 DM   TYpe 2 DM  - Early September 2021  patient has routine blood work done by PCP and showed HBA1c 5.5 and glucose ( non fasting 125 ) , had an acute illness with fever and ? URI for 2 weeks , tested for COVID was negative but had possible tick bite and was treated with antibiotics.  - after recovery he started to notice increase thirst, loosing weight so labs where done end of September, showed FS on 500, started on metformin 1000 mg BID with no improvement in FS so glipizide XR 10 mg was added .  10/2021: FS in 100 range had 1 episode of extreme hunger and felt shaky middle of the day .    1/2022 follow up : patient is here for follow up feeling better , no more blurry vision or numbness in extremities.  his FS in am are within target getting hypoglycemia symptoms around lunch. he did  not see yet opthalmology   6/2022: follow up: patient is feeling ok, has been struggling to maintain his healthy diet because of work , remain on metformin 1000 mg BID tolerating it ok  BP mildly high   12/2022: Telehealth visit : patient has no new complaints, am fasting FS ranging in 120 , was not eating very healthy lately but now trying to improve . remain on metformin 1000 mg BID , noted mild nausea lately  only no bloating no diarrhea   4/2024: A1c up to 6.8% did lower metformin to total 1000 mg daily as he had loose stools [Hypoglycemia] : Patient is not hypoglycemic.

## 2024-04-15 NOTE — DATA REVIEWED
[FreeTextEntry1] : 9/8/21: A1c 5.4%  glucose 125 crea 1.17 GFR 68 AST 35 ALT 70 TSH 1.02 ft4 1.3  9/30/21: glucose 388 crea 1.06 GFR 89 AST 19 ALT 30  hb 14.9 insulin 3 TSH 0.79  amylase 34  lipase 167 1/2022:  islet cell antibodes negative anti donna negative, A1c 5.8% GFR 97 normal tsh  6/2022: A1c 6% LDL 94  TG 85  alb/crea 450 glucose 97 crea 0.91  GFR 92 TSH 1.36 12/022: A1c 6%  glucose 127 crea 0.77    alb/crea negative TSH 1.74 b12 334   1/2024: A1c 6.4%   LDL 96  Tg 92 glucose 135    crea 0.78 TSh 1.68  b12 333  4/2024 : A1c 6.8% ACr 5  TSH 1.49b12 362    tg 81 glucose 137 crea 0.89

## 2024-05-05 NOTE — H&P ADULT - NSHPSOCIALHISTORY_GEN_ALL_CORE
Marital Status:  (   )    (   ) Single    (   )    (  )   Lives with: (  ) alone  (  ) children   (  ) spouse   (  ) parents  (  ) other  Recent Travel: No recent travel  Occupation:    Substance Use (street drugs): ( x ) never used  (  ) other:  Tobacco Usage:  ( x  ) never smoked   (   ) former smoker   (   ) current smoker  (     ) pack year  Alcohol Usage: None (3) Not Aware of Limitations Marital Status:  (X   )    (   ) Single    (   )    (  )   Lives with: (  ) alone  (  ) children   (  ) spouse   (  ) parents  (  ) other  Recent Travel: No recent travel  Occupation:    Substance Use (street drugs): ( x ) never used  (  ) other:  Tobacco Usage:  ( x  ) never smoked   (   ) former smoker   (   ) current smoker  (     ) pack year  Alcohol Usage: None

## 2024-07-08 ENCOUNTER — RX RENEWAL (OUTPATIENT)
Age: 59
End: 2024-07-08

## 2024-07-10 ENCOUNTER — RX RENEWAL (OUTPATIENT)
Age: 59
End: 2024-07-10

## 2024-08-12 ENCOUNTER — RX RENEWAL (OUTPATIENT)
Age: 59
End: 2024-08-12

## 2024-09-09 ENCOUNTER — RESULT CHARGE (OUTPATIENT)
Age: 59
End: 2024-09-09

## 2024-09-09 ENCOUNTER — APPOINTMENT (OUTPATIENT)
Dept: CARDIOLOGY | Facility: CLINIC | Age: 59
End: 2024-09-09
Payer: COMMERCIAL

## 2024-09-09 VITALS
BODY MASS INDEX: 26.95 KG/M2 | HEART RATE: 56 BPM | DIASTOLIC BLOOD PRESSURE: 82 MMHG | HEIGHT: 72 IN | WEIGHT: 199 LBS | SYSTOLIC BLOOD PRESSURE: 138 MMHG

## 2024-09-09 DIAGNOSIS — I48.0 PAROXYSMAL ATRIAL FIBRILLATION: ICD-10-CM

## 2024-09-09 DIAGNOSIS — R07.89 OTHER CHEST PAIN: ICD-10-CM

## 2024-09-09 DIAGNOSIS — E11.9 TYPE 2 DIABETES MELLITUS W/OUT COMPLICATIONS: ICD-10-CM

## 2024-09-09 PROCEDURE — 93000 ELECTROCARDIOGRAM COMPLETE: CPT

## 2024-09-09 PROCEDURE — G2211 COMPLEX E/M VISIT ADD ON: CPT | Mod: NC

## 2024-09-09 PROCEDURE — 99214 OFFICE O/P EST MOD 30 MIN: CPT | Mod: 25

## 2024-09-09 NOTE — ASSESSMENT
[FreeTextEntry1] : AF - no signs of recurrence; Patient complains of occasional skip beats which most likely are due to APCs and PVCs. No AF on 2 week event monitor. -  c/w flecainide   - I previously discussed with patient the fact the patient now has score of two. He does not want to take anticoagulation yet. (had side effects with Eliquis and Xarelto) - normal stress test/echo - f/u with EP - Dr. Blake. - f/u with endocrine for prediabetes. - no palpitations  Options of AC vs. c/w ASA discussed. He would like to hold off for now and monitor his HR and rhythm. DM, lipid control C/w BP control.  F/u in 6 months

## 2024-09-09 NOTE — HISTORY OF PRESENT ILLNESS
[FreeTextEntry1] : Pt is a 58 y/o male with PMH of mild mitral insufficiency, hypertension and hypercholesterolemia ,  DM. PAF asymptotic using flecainide  dx 3 years a go. ESTEFANIA with a oral appliance.  Pt had back pain on eliqus and xarlto and lovenox. . Tolerating flecinide. Pt s/p AF ablation 1/20. B/p at home in [place]  Patient flecainide was increased and is doing well.  As per pt he  has been feeling good.  Has very rare brief episodes  of palpitations, that resolved quickly. Patient recently has been diagnosed with diabetes. Therefore his CHADVASC score has increased to 2.   Pt denies chest pain, no SOB.  As per pt he drinks energy beverage occasionally.  Followed by Endocrinologist for DM.  Started on therapeutic diet.  lost wt since last visit  EKG SR 04/12/24 Chol 173 TRIG 81   HgbA1C 6.8  08/08/23 Exercise Stress Test EF 67% Grade I diastolic dysfunction  No Ischemia.

## 2024-09-16 ENCOUNTER — APPOINTMENT (OUTPATIENT)
Dept: ENDOCRINOLOGY | Facility: CLINIC | Age: 59
End: 2024-09-16
Payer: COMMERCIAL

## 2024-09-16 VITALS
SYSTOLIC BLOOD PRESSURE: 118 MMHG | DIASTOLIC BLOOD PRESSURE: 78 MMHG | OXYGEN SATURATION: 98 % | WEIGHT: 198 LBS | HEART RATE: 60 BPM | HEIGHT: 72 IN | BODY MASS INDEX: 26.82 KG/M2

## 2024-09-16 DIAGNOSIS — E78.5 HYPERLIPIDEMIA, UNSPECIFIED: ICD-10-CM

## 2024-09-16 DIAGNOSIS — E11.9 TYPE 2 DIABETES MELLITUS W/OUT COMPLICATIONS: ICD-10-CM

## 2024-09-16 DIAGNOSIS — I10 ESSENTIAL (PRIMARY) HYPERTENSION: ICD-10-CM

## 2024-09-16 PROCEDURE — 99213 OFFICE O/P EST LOW 20 MIN: CPT

## 2024-09-16 NOTE — HISTORY OF PRESENT ILLNESS
[Finger Stick] : Finger Stick: Yes [FreeTextEntry1] : 59 year old male with HTN, DL and Afib who present for follow up type 2 DM   TYpe 2 DM  - Early September 2021  patient has routine blood work done by PCP and showed HBA1c 5.5 and glucose ( non fasting 125 ) , had an acute illness with fever and ? URI for 2 weeks , tested for COVID was negative but had possible tick bite and was treated with antibiotics.  - after recovery he started to notice increase thirst, loosing weight so labs where done end of September, showed FS on 500, started on metformin 1000 mg BID with no improvement in FS so glipizide XR 10 mg was added .  10/2021: FS in 100 range had 1 episode of extreme hunger and felt shaky middle of the day .    1/2022 follow up : patient is here for follow up feeling better , no more blurry vision or numbness in extremities.  his FS in am are within target getting hypoglycemia symptoms around lunch. he did  not see yet ophthalmology   6/2022: follow up: patient is feeling ok, has been struggling to maintain his healthy diet because of work , remain on metformin 1000 mg BID tolerating it ok  BP mildly high   12/2022: Telehealth visit : patient has no new complaints, am fasting FS ranging in 120 , was not eating very healthy lately but now trying to improve . remain on metformin 1000 mg BID , noted mild nausea lately  only no bloating no diarrhea   4/2024: A1c up to 6.8% did lower metformin to total 1000 mg daily as he had loose stools  9/2024: A1c is back 6.3% on Metformin 500 mg in am and 1000 mg in pm , feels ok , no diarrhea  no statin    [Hypoglycemia] : Patient is not hypoglycemic.

## 2024-09-16 NOTE — ASSESSMENT
[Diabetes Foot Care] : diabetes foot care [Long Term Vascular Complications] : long term vascular complications of diabetes [Carbohydrate Consistent Diet] : carbohydrate consistent diet [Importance of Diet and Exercise] : importance of diet and exercise to improve glycemic control, achieve weight loss and improve cardiovascular health [Exercise/Effect on Glucose] : exercise/effect on glucose [Hypoglycemia Management] : hypoglycemia management [Self Monitoring of Blood Glucose] : self monitoring of blood glucose [Retinopathy Screening] : Patient was referred to ophthalmology for retinopathy screening [Weight Loss] : weight loss [FreeTextEntry1] : 59 year old male with HTN, DL and Afib who present for follow up type 2 DM   # well controlled type 2 DM /HTN  - 9/2024: A1c down to 6.3% on metformin 500 mg in am and 1000 mg in pm   - continue same for now  - antibodies checked negative  - not on statin ,  , check lipoprotein a  - on ARB/ amlodipine, metoprolol  BP  no albuminuria and GFR ok , managed with cardiology  - no  numbness in feet and saw podiatry with no concerns  - ophthalmology f/u due

## 2024-09-16 NOTE — DATA REVIEWED
[FreeTextEntry1] : 9/8/21: A1c 5.4%  glucose 125 crea 1.17 GFR 68 AST 35 ALT 70 TSH 1.02 ft4 1.3  9/30/21: glucose 388 crea 1.06 GFR 89 AST 19 ALT 30  hb 14.9 insulin 3 TSH 0.79  amylase 34  lipase 167 1/2022:  islet cell antibodes negative anti donna negative, A1c 5.8% GFR 97 normal tsh  6/2022: A1c 6% LDL 94  TG 85  alb/crea 450 glucose 97 crea 0.91  GFR 92 TSH 1.36 12/022: A1c 6%  glucose 127 crea 0.77    alb/crea negative TSH 1.74 b12 334   1/2024: A1c 6.4%   LDL 96  Tg 92 glucose 135    crea 0.78 TSh 1.68  b12 333  4/2024 : A1c 6.8% ACr 5  TSH 1.49b12 362    tg 81 glucose 137 crea 0.89    9/2024:A1c 6.3%    glucose 112  crea 0.81    TSH 2.07

## 2025-01-02 ENCOUNTER — RX RENEWAL (OUTPATIENT)
Age: 60
End: 2025-01-02

## 2025-01-30 ENCOUNTER — RX RENEWAL (OUTPATIENT)
Age: 60
End: 2025-01-30

## 2025-03-10 ENCOUNTER — RESULT CHARGE (OUTPATIENT)
Age: 60
End: 2025-03-10

## 2025-03-10 ENCOUNTER — NON-APPOINTMENT (OUTPATIENT)
Age: 60
End: 2025-03-10

## 2025-03-10 ENCOUNTER — APPOINTMENT (OUTPATIENT)
Dept: CARDIOLOGY | Facility: CLINIC | Age: 60
End: 2025-03-10
Payer: COMMERCIAL

## 2025-03-10 VITALS
SYSTOLIC BLOOD PRESSURE: 150 MMHG | HEART RATE: 60 BPM | OXYGEN SATURATION: 98 % | DIASTOLIC BLOOD PRESSURE: 92 MMHG | WEIGHT: 205 LBS | BODY MASS INDEX: 27.77 KG/M2 | HEIGHT: 72 IN

## 2025-03-10 VITALS — DIASTOLIC BLOOD PRESSURE: 90 MMHG | SYSTOLIC BLOOD PRESSURE: 140 MMHG

## 2025-03-10 DIAGNOSIS — I10 ESSENTIAL (PRIMARY) HYPERTENSION: ICD-10-CM

## 2025-03-10 DIAGNOSIS — E11.9 TYPE 2 DIABETES MELLITUS W/OUT COMPLICATIONS: ICD-10-CM

## 2025-03-10 DIAGNOSIS — I34.0 NONRHEUMATIC MITRAL (VALVE) INSUFFICIENCY: ICD-10-CM

## 2025-03-10 DIAGNOSIS — I48.0 PAROXYSMAL ATRIAL FIBRILLATION: ICD-10-CM

## 2025-03-10 DIAGNOSIS — Z86.69 PERSONAL HISTORY OF OTHER DISEASES OF THE NERVOUS SYSTEM AND SENSE ORGANS: ICD-10-CM

## 2025-03-10 DIAGNOSIS — E78.5 HYPERLIPIDEMIA, UNSPECIFIED: ICD-10-CM

## 2025-03-10 PROCEDURE — 99214 OFFICE O/P EST MOD 30 MIN: CPT

## 2025-03-10 PROCEDURE — 93000 ELECTROCARDIOGRAM COMPLETE: CPT

## 2025-03-10 PROCEDURE — G2211 COMPLEX E/M VISIT ADD ON: CPT | Mod: NC

## 2025-03-17 ENCOUNTER — APPOINTMENT (OUTPATIENT)
Dept: CARDIOLOGY | Facility: CLINIC | Age: 60
End: 2025-03-17

## 2025-03-26 ENCOUNTER — APPOINTMENT (OUTPATIENT)
Dept: ENDOCRINOLOGY | Facility: CLINIC | Age: 60
End: 2025-03-26
Payer: COMMERCIAL

## 2025-03-26 VITALS
BODY MASS INDEX: 27.77 KG/M2 | DIASTOLIC BLOOD PRESSURE: 70 MMHG | OXYGEN SATURATION: 97 % | HEART RATE: 62 BPM | SYSTOLIC BLOOD PRESSURE: 124 MMHG | WEIGHT: 205 LBS | HEIGHT: 72 IN

## 2025-03-26 DIAGNOSIS — E11.9 TYPE 2 DIABETES MELLITUS W/OUT COMPLICATIONS: ICD-10-CM

## 2025-03-26 DIAGNOSIS — E78.5 HYPERLIPIDEMIA, UNSPECIFIED: ICD-10-CM

## 2025-03-26 PROCEDURE — 99213 OFFICE O/P EST LOW 20 MIN: CPT

## 2025-07-07 ENCOUNTER — RX RENEWAL (OUTPATIENT)
Age: 60
End: 2025-07-07

## 2025-08-14 ENCOUNTER — RX RENEWAL (OUTPATIENT)
Age: 60
End: 2025-08-14

## 2025-09-16 ENCOUNTER — APPOINTMENT (OUTPATIENT)
Dept: CARDIOLOGY | Facility: CLINIC | Age: 60
End: 2025-09-16
Payer: COMMERCIAL

## 2025-09-16 VITALS
BODY MASS INDEX: 27.5 KG/M2 | WEIGHT: 203 LBS | DIASTOLIC BLOOD PRESSURE: 85 MMHG | HEIGHT: 72 IN | SYSTOLIC BLOOD PRESSURE: 130 MMHG | HEART RATE: 64 BPM

## 2025-09-16 DIAGNOSIS — E78.5 HYPERLIPIDEMIA, UNSPECIFIED: ICD-10-CM

## 2025-09-16 DIAGNOSIS — I10 ESSENTIAL (PRIMARY) HYPERTENSION: ICD-10-CM

## 2025-09-16 DIAGNOSIS — E11.9 TYPE 2 DIABETES MELLITUS W/OUT COMPLICATIONS: ICD-10-CM

## 2025-09-16 DIAGNOSIS — R00.2 PALPITATIONS: ICD-10-CM

## 2025-09-16 DIAGNOSIS — I34.0 NONRHEUMATIC MITRAL (VALVE) INSUFFICIENCY: ICD-10-CM

## 2025-09-16 DIAGNOSIS — I48.0 PAROXYSMAL ATRIAL FIBRILLATION: ICD-10-CM

## 2025-09-16 DIAGNOSIS — Z86.69 PERSONAL HISTORY OF OTHER DISEASES OF THE NERVOUS SYSTEM AND SENSE ORGANS: ICD-10-CM

## 2025-09-16 PROCEDURE — G2211 COMPLEX E/M VISIT ADD ON: CPT | Mod: NC

## 2025-09-16 PROCEDURE — 93000 ELECTROCARDIOGRAM COMPLETE: CPT

## 2025-09-16 PROCEDURE — 99214 OFFICE O/P EST MOD 30 MIN: CPT
